# Patient Record
Sex: MALE | Race: WHITE | HISPANIC OR LATINO | Employment: OTHER | URBAN - METROPOLITAN AREA
[De-identification: names, ages, dates, MRNs, and addresses within clinical notes are randomized per-mention and may not be internally consistent; named-entity substitution may affect disease eponyms.]

---

## 2017-01-04 ENCOUNTER — HOSPITAL ENCOUNTER (INPATIENT)
Facility: HOSPITAL | Age: 72
LOS: 1 days | Discharge: HOME | DRG: 880 | End: 2017-01-05
Attending: EMERGENCY MEDICINE | Admitting: INTERNAL MEDICINE
Payer: MEDICARE

## 2017-01-04 ENCOUNTER — APPOINTMENT (EMERGENCY)
Dept: RADIOLOGY | Facility: HOSPITAL | Age: 72
DRG: 880 | End: 2017-01-04
Payer: MEDICARE

## 2017-01-04 ENCOUNTER — APPOINTMENT (EMERGENCY)
Dept: CT IMAGING | Facility: HOSPITAL | Age: 72
DRG: 880 | End: 2017-01-04
Payer: MEDICARE

## 2017-01-04 DIAGNOSIS — I10 UNCONTROLLED HYPERTENSION: Primary | ICD-10-CM

## 2017-01-04 DIAGNOSIS — I71.2 THORACIC ANEURYSM WITHOUT MENTION OF RUPTURE: ICD-10-CM

## 2017-01-04 PROBLEM — R29.898 LOWER EXTREMITY WEAKNESS: Status: ACTIVE | Noted: 2017-01-04

## 2017-01-04 LAB
ALBUMIN SERPL BCP-MCNC: 3.8 G/DL (ref 3.5–5)
ALP SERPL-CCNC: 59 U/L (ref 46–116)
ALT SERPL W P-5'-P-CCNC: 23 U/L (ref 12–78)
ANION GAP SERPL CALCULATED.3IONS-SCNC: 8 MMOL/L (ref 4–13)
AST SERPL W P-5'-P-CCNC: 15 U/L (ref 5–45)
ATRIAL RATE: 66 BPM
BACTERIA UR QL AUTO: ABNORMAL /HPF
BASOPHILS # BLD AUTO: 0.03 THOUSANDS/ΜL (ref 0–0.1)
BASOPHILS NFR BLD AUTO: 0 % (ref 0–1)
BILIRUB SERPL-MCNC: 1 MG/DL (ref 0.2–1)
BILIRUB UR QL STRIP: NEGATIVE
BUN SERPL-MCNC: 25 MG/DL (ref 5–25)
CALCIUM SERPL-MCNC: 8.9 MG/DL (ref 8.3–10.1)
CHLORIDE SERPL-SCNC: 104 MMOL/L (ref 100–108)
CK SERPL-CCNC: 126 U/L (ref 39–308)
CLARITY UR: CLEAR
CO2 SERPL-SCNC: 27 MMOL/L (ref 21–32)
COLOR UR: YELLOW
CREAT SERPL-MCNC: 0.98 MG/DL (ref 0.6–1.3)
EOSINOPHIL # BLD AUTO: 0.16 THOUSAND/ΜL (ref 0–0.61)
EOSINOPHIL NFR BLD AUTO: 1 % (ref 0–6)
ERYTHROCYTE [DISTWIDTH] IN BLOOD BY AUTOMATED COUNT: 12.3 % (ref 11.6–15.1)
GFR SERPL CREATININE-BSD FRML MDRD: >60 ML/MIN/1.73SQ M
GLUCOSE SERPL-MCNC: 93 MG/DL (ref 65–140)
GLUCOSE UR STRIP-MCNC: NEGATIVE MG/DL
HCT VFR BLD AUTO: 43 % (ref 36.5–49.3)
HGB BLD-MCNC: 14.8 G/DL (ref 12–17)
HGB UR QL STRIP.AUTO: ABNORMAL
KETONES UR STRIP-MCNC: NEGATIVE MG/DL
LEUKOCYTE ESTERASE UR QL STRIP: NEGATIVE
LYMPHOCYTES # BLD AUTO: 3.31 THOUSANDS/ΜL (ref 0.6–4.47)
LYMPHOCYTES NFR BLD AUTO: 26 % (ref 14–44)
MCH RBC QN AUTO: 32.7 PG (ref 26.8–34.3)
MCHC RBC AUTO-ENTMCNC: 34.4 G/DL (ref 31.4–37.4)
MCV RBC AUTO: 95 FL (ref 82–98)
MONOCYTES # BLD AUTO: 0.93 THOUSAND/ΜL (ref 0.17–1.22)
MONOCYTES NFR BLD AUTO: 7 % (ref 4–12)
NEUTROPHILS # BLD AUTO: 8.26 THOUSANDS/ΜL (ref 1.85–7.62)
NEUTS SEG NFR BLD AUTO: 65 % (ref 43–75)
NITRITE UR QL STRIP: NEGATIVE
NON-SQ EPI CELLS URNS QL MICRO: ABNORMAL /HPF
NRBC BLD AUTO-RTO: 0 /100 WBCS
P AXIS: 37 DEGREES
PH UR STRIP.AUTO: 6.5 [PH] (ref 4.5–8)
PLATELET # BLD AUTO: 357 THOUSANDS/UL (ref 149–390)
PMV BLD AUTO: 10.8 FL (ref 8.9–12.7)
POTASSIUM SERPL-SCNC: 3.8 MMOL/L (ref 3.5–5.3)
PR INTERVAL: 172 MS
PROT SERPL-MCNC: 7.4 G/DL (ref 6.4–8.2)
PROT UR STRIP-MCNC: ABNORMAL MG/DL
QRS AXIS: -38 DEGREES
QRSD INTERVAL: 98 MS
QT INTERVAL: 378 MS
QTC INTERVAL: 396 MS
RBC # BLD AUTO: 4.52 MILLION/UL (ref 3.88–5.62)
RBC #/AREA URNS AUTO: ABNORMAL /HPF
SODIUM SERPL-SCNC: 139 MMOL/L (ref 136–145)
SP GR UR STRIP.AUTO: 1.01 (ref 1–1.03)
T WAVE AXIS: 37 DEGREES
TROPONIN I SERPL-MCNC: <0.02 NG/ML
UROBILINOGEN UR QL STRIP.AUTO: 0.2 E.U./DL
VENTRICULAR RATE: 66 BPM
WBC # BLD AUTO: 12.76 THOUSAND/UL (ref 4.31–10.16)
WBC #/AREA URNS AUTO: ABNORMAL /HPF

## 2017-01-04 PROCEDURE — 82550 ASSAY OF CK (CPK): CPT | Performed by: INTERNAL MEDICINE

## 2017-01-04 PROCEDURE — 74175 CTA ABDOMEN W/CONTRAST: CPT

## 2017-01-04 PROCEDURE — 85025 COMPLETE CBC W/AUTO DIFF WBC: CPT | Performed by: EMERGENCY MEDICINE

## 2017-01-04 PROCEDURE — 84484 ASSAY OF TROPONIN QUANT: CPT | Performed by: EMERGENCY MEDICINE

## 2017-01-04 PROCEDURE — 93005 ELECTROCARDIOGRAM TRACING: CPT | Performed by: EMERGENCY MEDICINE

## 2017-01-04 PROCEDURE — 80053 COMPREHEN METABOLIC PANEL: CPT | Performed by: EMERGENCY MEDICINE

## 2017-01-04 PROCEDURE — 36415 COLL VENOUS BLD VENIPUNCTURE: CPT | Performed by: EMERGENCY MEDICINE

## 2017-01-04 PROCEDURE — 71020 HB CHEST X-RAY 2VW FRONTAL&LATL: CPT

## 2017-01-04 PROCEDURE — 87086 URINE CULTURE/COLONY COUNT: CPT | Performed by: EMERGENCY MEDICINE

## 2017-01-04 PROCEDURE — 71275 CT ANGIOGRAPHY CHEST: CPT

## 2017-01-04 PROCEDURE — 70450 CT HEAD/BRAIN W/O DYE: CPT

## 2017-01-04 PROCEDURE — 81001 URINALYSIS AUTO W/SCOPE: CPT | Performed by: EMERGENCY MEDICINE

## 2017-01-04 RX ORDER — LISINOPRIL 20 MG/1
20 TABLET ORAL DAILY
COMMUNITY
End: 2017-09-13

## 2017-01-04 RX ORDER — LOSARTAN POTASSIUM 50 MG/1
100 TABLET ORAL DAILY
Status: DISCONTINUED | OUTPATIENT
Start: 2017-01-05 | End: 2017-01-05 | Stop reason: HOSPADM

## 2017-01-04 RX ORDER — ALPRAZOLAM 0.5 MG/1
0.25 TABLET ORAL 2 TIMES DAILY PRN
Status: DISCONTINUED | OUTPATIENT
Start: 2017-01-04 | End: 2017-01-05 | Stop reason: HOSPADM

## 2017-01-04 RX ADMIN — METOPROLOL TARTRATE 2.5 MG: 5 INJECTION, SOLUTION INTRAVENOUS at 18:55

## 2017-01-04 RX ADMIN — METOPROLOL TARTRATE 25 MG: 25 TABLET ORAL at 21:29

## 2017-01-04 RX ADMIN — ALPRAZOLAM 0.25 MG: 0.5 TABLET ORAL at 18:55

## 2017-01-04 RX ADMIN — IOHEXOL 100 ML: 350 INJECTION, SOLUTION INTRAVENOUS at 15:48

## 2017-01-05 ENCOUNTER — APPOINTMENT (INPATIENT)
Dept: NON INVASIVE DIAGNOSTICS | Facility: HOSPITAL | Age: 72
DRG: 880 | End: 2017-01-05
Payer: MEDICARE

## 2017-01-05 VITALS
BODY MASS INDEX: 29.68 KG/M2 | OXYGEN SATURATION: 98 % | HEART RATE: 70 BPM | WEIGHT: 178.13 LBS | RESPIRATION RATE: 18 BRPM | DIASTOLIC BLOOD PRESSURE: 79 MMHG | SYSTOLIC BLOOD PRESSURE: 146 MMHG | HEIGHT: 65 IN | TEMPERATURE: 98.1 F

## 2017-01-05 LAB
BACTERIA UR CULT: NORMAL
MAGNESIUM SERPL-MCNC: 2.2 MG/DL (ref 1.6–2.6)
PLATELET # BLD AUTO: 357 THOUSANDS/UL (ref 149–390)
PMV BLD AUTO: 10.7 FL (ref 8.9–12.7)
TSH SERPL DL<=0.05 MIU/L-ACNC: 2.22 UIU/ML (ref 0.36–3.74)

## 2017-01-05 PROCEDURE — G8980 MOBILITY D/C STATUS: HCPCS

## 2017-01-05 PROCEDURE — 97162 PT EVAL MOD COMPLEX 30 MIN: CPT

## 2017-01-05 PROCEDURE — 85049 AUTOMATED PLATELET COUNT: CPT | Performed by: INTERNAL MEDICINE

## 2017-01-05 PROCEDURE — G8978 MOBILITY CURRENT STATUS: HCPCS

## 2017-01-05 PROCEDURE — 84443 ASSAY THYROID STIM HORMONE: CPT | Performed by: INTERNAL MEDICINE

## 2017-01-05 PROCEDURE — 99285 EMERGENCY DEPT VISIT HI MDM: CPT

## 2017-01-05 PROCEDURE — G8979 MOBILITY GOAL STATUS: HCPCS

## 2017-01-05 PROCEDURE — 83735 ASSAY OF MAGNESIUM: CPT | Performed by: INTERNAL MEDICINE

## 2017-01-05 PROCEDURE — 36415 COLL VENOUS BLD VENIPUNCTURE: CPT | Performed by: INTERNAL MEDICINE

## 2017-01-05 RX ORDER — LOSARTAN POTASSIUM 100 MG/1
100 TABLET ORAL DAILY
Qty: 30 TABLET | Refills: 0 | Status: SHIPPED | OUTPATIENT
Start: 2017-01-05 | End: 2018-08-28

## 2017-01-05 RX ORDER — ONDANSETRON 2 MG/ML
4 INJECTION INTRAMUSCULAR; INTRAVENOUS EVERY 6 HOURS PRN
Status: DISCONTINUED | OUTPATIENT
Start: 2017-01-05 | End: 2017-01-05 | Stop reason: HOSPADM

## 2017-01-05 RX ORDER — ACETAMINOPHEN 325 MG/1
650 TABLET ORAL EVERY 6 HOURS PRN
Status: DISCONTINUED | OUTPATIENT
Start: 2017-01-05 | End: 2017-01-05 | Stop reason: HOSPADM

## 2017-01-05 RX ORDER — ALPRAZOLAM 0.25 MG/1
0.25 TABLET ORAL 2 TIMES DAILY PRN
Qty: 15 TABLET | Refills: 0 | Status: SHIPPED | OUTPATIENT
Start: 2017-01-05 | End: 2018-02-19

## 2017-01-05 RX ADMIN — LOSARTAN POTASSIUM 100 MG: 50 TABLET, FILM COATED ORAL at 10:45

## 2017-01-05 RX ADMIN — ENOXAPARIN SODIUM 40 MG: 40 INJECTION SUBCUTANEOUS at 10:45

## 2017-01-05 RX ADMIN — METOPROLOL TARTRATE 12.5 MG: 25 TABLET ORAL at 10:45

## 2017-09-01 ENCOUNTER — ALLSCRIPTS OFFICE VISIT (OUTPATIENT)
Dept: OTHER | Facility: OTHER | Age: 72
End: 2017-09-01

## 2017-09-01 DIAGNOSIS — R26.9 ABNORMALITY OF GAIT AND MOBILITY: ICD-10-CM

## 2017-09-12 ENCOUNTER — HOSPITAL ENCOUNTER (OUTPATIENT)
Dept: MRI IMAGING | Facility: CLINIC | Age: 72
Discharge: HOME | End: 2017-09-12
Payer: MEDICARE

## 2017-09-12 DIAGNOSIS — R26.9 ABNORMALITY OF GAIT AND MOBILITY: ICD-10-CM

## 2017-09-12 PROCEDURE — 70551 MRI BRAIN STEM W/O DYE: CPT

## 2017-09-19 ENCOUNTER — ALLSCRIPTS OFFICE VISIT (OUTPATIENT)
Dept: OTHER | Facility: OTHER | Age: 72
End: 2017-09-19

## 2018-01-14 VITALS
HEART RATE: 74 BPM | WEIGHT: 175 LBS | BODY MASS INDEX: 29.16 KG/M2 | RESPIRATION RATE: 18 BRPM | DIASTOLIC BLOOD PRESSURE: 112 MMHG | SYSTOLIC BLOOD PRESSURE: 188 MMHG | HEIGHT: 65 IN

## 2018-01-15 NOTE — PROCEDURES
Results/Data    Procedure: Electromyogram and Nerve Conduction Study  Indication: Bilateral Lower Extremities   Referred by Dr Ora Ahumada  The procedure's were discussed with the patient  Written consent was obtained prior to the procedure and is detailed in the patient's record  Prior to the start of the procedure a time out was taken and the identity of the patient was confirmed via name and date of birth with the patient  The correct site and the procedure to be performed were confirmed  The correct side was confirmed if applicable  The positioning of the patient was verified  The availability of the correct equipment was verified  Procedure Start Time: 10:30    Technique: A sterile concentric needle electrode was used  The patient tolerated the procedure well  There were no complications  Results : Motor and sensory nerve conduction studies were performed on the bilateral peroneal, tibial and sural nerves  The right peroneal motor terminal latency was within normal limits with a low compound motor action potential amplitude and a normal conduction velocity distally and across the fibula head  The left peroneal motor terminal latency was within normal limits with a low compound motor action potential amplitude and a normal conduction velocity distally and across the fibula head  The bilateral tibial compound motor action potentials were within normal limits  The bilateral peroneal and tibial F waves were within normal limits  The rightsural sensory peak latency was within normal limits with a low sensory action potential amplitude  The left sural sensory action potential was within normal limits  The bilateral H soleus responses were within normal limits  Concentric needle examination was performed on various proximal and distal muscles bilaterally including gluteus medius, vastus lateralis, tibialis anterior, medial gastrocnemius, EDB, L4-5 and L5-S1 paraspinal myotomes   There was no evidence of active denervation in any of the muscles tested  Mild decreased recruitment of giant motor units was noted in the bilateral gluteus medius and moderate decreased recruitment of polyphasic motor units was noted in the bilateral EDB  The compound motor unit action potentials were of normal configuration with interference patterns being full or full for effort in the remaining muscles tested  Interpretation: There is electrophysiologic evidence of a:    1  Bilateral moderate chronic L5 radiculopathy as evidenced by the low peroneal motor amplitude and chronic denervation changes in the above-mentioned muscles  2  The low sural sensory amplitude on the right is of questionable significance  It maybe secondary to technical letter  Clinical and imaging correlation of the lumbosacral spine is suggested        Signatures   Electronically signed by : Astrid Mead MD; Sep 19 2017 11:33AM EST                       (Author)

## 2018-02-19 ENCOUNTER — OFFICE VISIT (OUTPATIENT)
Dept: NEUROLOGY | Facility: CLINIC | Age: 73
End: 2018-02-19
Payer: MEDICARE

## 2018-02-19 VITALS
HEART RATE: 73 BPM | SYSTOLIC BLOOD PRESSURE: 130 MMHG | DIASTOLIC BLOOD PRESSURE: 98 MMHG | WEIGHT: 181 LBS | BODY MASS INDEX: 30.12 KG/M2

## 2018-02-19 DIAGNOSIS — F41.9 ANXIETY: ICD-10-CM

## 2018-02-19 DIAGNOSIS — M54.16 RADICULOPATHY, LUMBAR REGION: Primary | ICD-10-CM

## 2018-02-19 PROCEDURE — 99213 OFFICE O/P EST LOW 20 MIN: CPT | Performed by: PSYCHIATRY & NEUROLOGY

## 2018-02-19 NOTE — PROGRESS NOTES
Progress Note - Neurology   Marcus Barrett 74 y o  male MRN: [id number]  Unit/Bed#:  Encounter: [id number]      Subjective:   Patient is here for a follow-up visit and since his last visit he has been doing well with improved gait, and denies any sensory motor symptoms in the lower extremities  Patient did not get his blood work done and had EMG studies which showed evidence of chronic bilateral L5 radiculopathy and an MRI of the brain also showed evidence of chronic T2 white matter changes  No evidence of any normal pressure hydrocephalus or any acute CVA  Patient denies any new neurological symptoms at this time  ROS:   Review of Systems   Constitutional: Negative  HENT: Negative  Eyes: Negative  Respiratory: Negative  Cardiovascular: Negative  Gastrointestinal: Negative  Endocrine: Negative  Genitourinary: Negative  Musculoskeletal: Negative  Skin: Negative  Allergic/Immunologic: Negative  Neurological: Negative  Hematological: Negative  Psychiatric/Behavioral: Negative  Vitals:   Vitals:    02/19/18 1417   BP: 130/98   Pulse: 73   ,Body mass index is 30 12 kg/m²  MEDS:      Current Outpatient Prescriptions:     losartan (COZAAR) 100 MG tablet, Take 1 tablet by mouth daily for 30 days, Disp: 30 tablet, Rfl: 0    metoprolol tartrate (LOPRESSOR) 25 mg tablet, Take 0 5 tablets by mouth every 12 (twelve) hours for 30 days, Disp: 30 tablet, Rfl: 0  :    Physical Exam:  General appearance: alert, appears stated age and cooperative  Head: Normocephalic, without obvious abnormality, atraumatic    Neurologic:  His examination shows no evidence of any cranial nerve deficit, motor or sensory deficits in the upper lower extremities, there is no evidence of any dysmetria and his gait is normal based  There is no evidence of any cervical or lumbosacral tenderness  No bruits were appreciable in the neck      Lab Results: I have personally reviewed pertinent reports  Imaging Studies: I have personally reviewed pertinent reports  Assessment:  1  Chronic lumbar radiculopathy  2  Anxiety disorder  Plan:  Patient is advised to continue med present medications, home exercise program is encouraged and will follow up with me again in 6 months  2/19/2018,2:22 PM    Dictation voice to text software has been used in the creation of this document  Please consider this in light of any contextual or grammatical errors

## 2018-03-14 ENCOUNTER — APPOINTMENT (EMERGENCY)
Dept: RADIOLOGY | Facility: HOSPITAL | Age: 73
End: 2018-03-14
Payer: COMMERCIAL

## 2018-03-14 ENCOUNTER — HOSPITAL ENCOUNTER (EMERGENCY)
Facility: HOSPITAL | Age: 73
Discharge: HOME | End: 2018-03-14
Admitting: EMERGENCY MEDICINE
Payer: COMMERCIAL

## 2018-03-14 VITALS
DIASTOLIC BLOOD PRESSURE: 81 MMHG | HEART RATE: 58 BPM | SYSTOLIC BLOOD PRESSURE: 133 MMHG | BODY MASS INDEX: 29.95 KG/M2 | TEMPERATURE: 97.5 F | OXYGEN SATURATION: 98 % | RESPIRATION RATE: 16 BRPM | WEIGHT: 180 LBS

## 2018-03-14 DIAGNOSIS — F43.0 STRESS REACTION: ICD-10-CM

## 2018-03-14 DIAGNOSIS — R07.89 DISCOMFORT IN CHEST: Primary | ICD-10-CM

## 2018-03-14 LAB
ALBUMIN SERPL BCP-MCNC: 3.3 G/DL (ref 3.5–5)
ALP SERPL-CCNC: 62 U/L (ref 46–116)
ALT SERPL W P-5'-P-CCNC: 25 U/L (ref 12–78)
ANION GAP SERPL CALCULATED.3IONS-SCNC: 6 MMOL/L (ref 4–13)
AST SERPL W P-5'-P-CCNC: 19 U/L (ref 5–45)
BASOPHILS # BLD AUTO: 0.05 THOUSANDS/ΜL (ref 0–0.1)
BASOPHILS NFR BLD AUTO: 0 % (ref 0–1)
BILIRUB SERPL-MCNC: 0.4 MG/DL (ref 0.2–1)
BUN SERPL-MCNC: 21 MG/DL (ref 5–25)
CALCIUM SERPL-MCNC: 8.2 MG/DL (ref 8.3–10.1)
CHLORIDE SERPL-SCNC: 105 MMOL/L (ref 100–108)
CO2 SERPL-SCNC: 29 MMOL/L (ref 21–32)
CREAT SERPL-MCNC: 1.15 MG/DL (ref 0.6–1.3)
EOSINOPHIL # BLD AUTO: 0.41 THOUSAND/ΜL (ref 0–0.61)
EOSINOPHIL NFR BLD AUTO: 4 % (ref 0–6)
ERYTHROCYTE [DISTWIDTH] IN BLOOD BY AUTOMATED COUNT: 12 % (ref 11.6–15.1)
GFR SERPL CREATININE-BSD FRML MDRD: 62 ML/MIN/1.73SQ M
GLUCOSE SERPL-MCNC: 120 MG/DL (ref 65–140)
HCT VFR BLD AUTO: 42.1 % (ref 36.5–49.3)
HGB BLD-MCNC: 14.4 G/DL (ref 12–17)
LYMPHOCYTES # BLD AUTO: 3.25 THOUSANDS/ΜL (ref 0.6–4.47)
LYMPHOCYTES NFR BLD AUTO: 28 % (ref 14–44)
MCH RBC QN AUTO: 33.1 PG (ref 26.8–34.3)
MCHC RBC AUTO-ENTMCNC: 34.2 G/DL (ref 31.4–37.4)
MCV RBC AUTO: 97 FL (ref 82–98)
MONOCYTES # BLD AUTO: 0.78 THOUSAND/ΜL (ref 0.17–1.22)
MONOCYTES NFR BLD AUTO: 7 % (ref 4–12)
NEUTROPHILS # BLD AUTO: 7.03 THOUSANDS/ΜL (ref 1.85–7.62)
NEUTS SEG NFR BLD AUTO: 61 % (ref 43–75)
NRBC BLD AUTO-RTO: 0 /100 WBCS
PLATELET # BLD AUTO: 276 THOUSANDS/UL (ref 149–390)
PMV BLD AUTO: 10.6 FL (ref 8.9–12.7)
POTASSIUM SERPL-SCNC: 3.7 MMOL/L (ref 3.5–5.3)
PROT SERPL-MCNC: 6.8 G/DL (ref 6.4–8.2)
RBC # BLD AUTO: 4.35 MILLION/UL (ref 3.88–5.62)
SODIUM SERPL-SCNC: 140 MMOL/L (ref 136–145)
TROPONIN I SERPL-MCNC: <0.02 NG/ML
TROPONIN I SERPL-MCNC: <0.02 NG/ML
WBC # BLD AUTO: 11.55 THOUSAND/UL (ref 4.31–10.16)

## 2018-03-14 PROCEDURE — 85025 COMPLETE CBC W/AUTO DIFF WBC: CPT | Performed by: NURSE PRACTITIONER

## 2018-03-14 PROCEDURE — 80053 COMPREHEN METABOLIC PANEL: CPT | Performed by: NURSE PRACTITIONER

## 2018-03-14 PROCEDURE — 99285 EMERGENCY DEPT VISIT HI MDM: CPT

## 2018-03-14 PROCEDURE — 36415 COLL VENOUS BLD VENIPUNCTURE: CPT | Performed by: NURSE PRACTITIONER

## 2018-03-14 PROCEDURE — 71046 X-RAY EXAM CHEST 2 VIEWS: CPT

## 2018-03-14 PROCEDURE — 84484 ASSAY OF TROPONIN QUANT: CPT | Performed by: NURSE PRACTITIONER

## 2018-03-14 PROCEDURE — 93005 ELECTROCARDIOGRAM TRACING: CPT

## 2018-03-14 PROCEDURE — 93010 ELECTROCARDIOGRAM REPORT: CPT | Performed by: INTERNAL MEDICINE

## 2018-03-14 NOTE — ED NOTES
History     Chief Complaint   Patient presents with    Chest Pain     Pt c/o left sided chest pain but states he thinks its stress/anxiety  Pt states no heart problems but PCP had him do an ECHO and blood work last week  Pt states he took " one of my tranquilizer pills to see if it would help " Pt does appear anxious  This is a 74year old male presenting to the ER for c/o chest pain while driving and drinking strong coffee  He states that he thinks its due to anxiety and took his "tranquilzer pill"  The pain was on his left chest with radiation of pain into his neck  Denies pain in his left arm and through to his back  He has a history of HTN and has been compliant with his medication  He admits to taking ASA 81 mg prior to arrival      Differential Diagnosis- Panic Attack/ Anxiety, ACS,             Past Medical History:   Diagnosis Date    Anxiety     Hypertension     Leg weakness        History reviewed  No pertinent surgical history  History reviewed  No pertinent family history  Social History   Substance Use Topics    Smoking status: Never Smoker    Smokeless tobacco: Never Used    Alcohol use No       Review of Systems   Constitutional: Negative for diaphoresis  HENT: Negative for congestion  Eyes: Negative for visual disturbance  Respiratory: Negative for cough, shortness of breath and wheezing  Cardiovascular: Positive for chest pain  Negative for leg swelling  Gastrointestinal: Negative for abdominal pain, diarrhea, nausea and vomiting  Endocrine: Negative  Genitourinary: Negative  Musculoskeletal: Negative  Skin: Negative  Allergic/Immunologic: Negative  Neurological: Negative for dizziness, syncope, weakness, light-headedness and numbness  Hematological: Negative  Psychiatric/Behavioral: Negative          Physical Exam     ED Triage Vitals   Temperature Pulse Respirations Blood Pressure SpO2   03/14/18 1851 03/14/18 1849 03/14/18 1849 03/14/18 1849 03/14/18 1849   97 5 °F (36 4 °C) 77 20 (!) 188/97 97 %      Temp Source Heart Rate Source Patient Position - Orthostatic VS BP Location FiO2 (%)   03/14/18 1851 03/14/18 1849 03/14/18 1849 03/14/18 1849 --   Oral Monitor Sitting Left arm       Pain Score       --                  Physical Exam   Constitutional: He is oriented to person, place, and time  He appears well-developed and well-nourished  No distress  HENT:   Head: Normocephalic  Eyes: EOM are normal    Neck: Normal range of motion  Neck supple  No JVD present  Cardiovascular: Normal rate, regular rhythm, normal heart sounds and intact distal pulses  Exam reveals no gallop and no friction rub  No murmur heard  Pulmonary/Chest: Effort normal and breath sounds normal  No respiratory distress  He has no wheezes  He exhibits tenderness  Abdominal: Soft  Bowel sounds are normal  He exhibits no distension  There is no tenderness  Musculoskeletal: Normal range of motion  He exhibits no edema  Neurological: He is alert and oriented to person, place, and time  Skin: Skin is warm and dry  Psychiatric: His mood appears anxious         ED Course     Assessment and Plan    Chest Pain:    · Labs- CBC, CMP, Troponin + Delta Trop, Magnesium  · EKG  · Chest X-ray  · Insert peripheral IV  · Continuous Cardiac  Monitoring    XR chest 2 views    (Results Pending)   CxR- Unremarkable  Results Reviewed     Procedure Component Value Units Date/Time    Troponin I [[id number]]  (Normal) Collected:  03/14/18 2149    Lab Status:  Final result Specimen:  Blood from Arm, Right Updated:  03/14/18 2214     Troponin I <0 02 ng/mL     Narrative:         Siemens Chemistry analyzer 99% cutoff is > 0 04 ng/mL in network labs    o cTnI 99% cutoff is useful only when applied to patients in the clinical setting of myocardial ischemia  o cTnI 99% cutoff should be interpreted in the context of clinical history, ECG findings and possibly cardiac imaging to establish correct diagnosis  o cTnI 99% cutoff may be suggestive but clearly not indicative of a coronary event without the clinical setting of myocardial ischemia  Comprehensive metabolic panel [[id number]]  (Abnormal) Collected:  03/14/18 2011    Lab Status:  Final result Specimen:  Blood from Arm, Right Updated:  03/14/18 2129     Sodium 140 mmol/L      Potassium 3 7 mmol/L      Chloride 105 mmol/L      CO2 29 mmol/L      Anion Gap 6 mmol/L      BUN 21 mg/dL      Creatinine 1 15 mg/dL      Glucose 120 mg/dL      Calcium 8 2 (L) mg/dL      AST 19 U/L      ALT 25 U/L      Alkaline Phosphatase 62 U/L      Total Protein 6 8 g/dL      Albumin 3 3 (L) g/dL      Total Bilirubin 0 40 mg/dL      eGFR 62 ml/min/1 73sq m     Narrative:       Specimen Lipemic    National Kidney Disease Education Program recommendations are as follows:  GFR calculation is accurate only with a steady state creatinine  Chronic Kidney disease less than 60 ml/min/1 73 sq  meters  Kidney failure less than 15 ml/min/1 73 sq  meters  Troponin I [[id number]]  (Normal) Collected:  03/14/18 2011    Lab Status:  Final result Specimen:  Blood from Arm, Right Updated:  03/14/18 2037     Troponin I <0 02 ng/mL     Narrative:         [employer] Chemistry analyzer 99% cutoff is > 0 04 ng/mL in network labs    o cTnI 99% cutoff is useful only when applied to patients in the clinical setting of myocardial ischemia  o cTnI 99% cutoff should be interpreted in the context of clinical history, ECG findings and possibly cardiac imaging to establish correct diagnosis  o cTnI 99% cutoff may be suggestive but clearly not indicative of a coronary event without the clinical setting of myocardial ischemia      CBC and differential [[id number]]  (Abnormal) Collected:  03/14/18 2011    Lab Status:  Final result Specimen:  Blood from Arm, Right Updated:  03/14/18 2018     WBC 11 55 (H) Thousand/uL      RBC 4 35 Million/uL      Hemoglobin 14 4 g/dL      Hematocrit 42 1 %      MCV 97 fL      MCH 33 1 pg      MCHC 34 2 g/dL      RDW 12 0 %      MPV 10 6 fL      Platelets 341 Thousands/uL      nRBC 0 /100 WBCs      Neutrophils Relative 61 %      Lymphocytes Relative 28 %      Monocytes Relative 7 %      Eosinophils Relative 4 %      Basophils Relative 0 %      Neutrophils Absolute 7 03 Thousands/µL      Lymphocytes Absolute 3 25 Thousands/µL      Monocytes Absolute 0 78 Thousand/µL      Eosinophils Absolute 0 41 Thousand/µL      Basophils Absolute 0 05 Thousands/µL         EKG- Sinus Rhythm    Impression/Recommendations:    Non-Specific Chest Pain  · Probable anxiety related  · All labs, EKG and Xray unremarkable  · Follow up with your PCP  · Continue current medications  · Return to the ER for worsening problems/concerns        Elie Angeles RN, BSN/ NP KEVAN Burgess, RN  03/14/18 6952

## 2018-03-14 NOTE — ED PROVIDER NOTES
History  Chief Complaint   Patient presents with    Chest Pain     Pt c/o left sided chest pain but states he thinks its stress/anxiety  Pt states no heart problems but PCP had him do an ECHO and blood work last week  Pt states he took " one of my tranquilizer pills to see if it would help " Pt does appear anxious  This is a 67-year-old male developed some chest discomfort while driving  This was occurring around 6:00 p m  he states that he took 1 of his tranquilizer pills which we believe is alprazolam for his symptoms  He reports that he also took some aspirin  He denies any nausea vomiting or diaphoresis  There is no radiation of the pain  Seems to be located over the left anterior chest wall  It does not seem to be exertional in is improved at this point  Plan is to evaluate him for acute coronary syndrome  Will likely need to do delta troponin given that his symptoms occurred at 6:00 p m  will repeat troponin at 10:00 p m  Lajean Cassette Prior to Admission Medications   Prescriptions Last Dose Informant Patient Reported? Taking?   losartan (COZAAR) 100 MG tablet   No No   Sig: Take 1 tablet by mouth daily for 30 days   metoprolol tartrate (LOPRESSOR) 25 mg tablet   No No   Sig: Take 0 5 tablets by mouth every 12 (twelve) hours for 30 days      Facility-Administered Medications: None       Past Medical History:   Diagnosis Date    Anxiety     Hypertension     Leg weakness        History reviewed  No pertinent surgical history  History reviewed  No pertinent family history  I have reviewed and agree with the history as documented  Social History   Substance Use Topics    Smoking status: Never Smoker    Smokeless tobacco: Never Used    Alcohol use No        Review of Systems   Constitutional: Negative for diaphoresis, fatigue and fever  HENT: Negative for congestion, ear pain, nosebleeds and sore throat  Eyes: Negative for photophobia, pain, discharge and visual disturbance  Respiratory: Negative for cough, choking, chest tightness, shortness of breath and wheezing  Cardiovascular: Positive for chest pain  Negative for palpitations  Gastrointestinal: Negative for abdominal distention, abdominal pain, diarrhea and vomiting  Genitourinary: Negative for dysuria, flank pain and frequency  Musculoskeletal: Negative for back pain, gait problem and joint swelling  Skin: Negative for color change and rash  Neurological: Negative for dizziness, syncope and headaches  Psychiatric/Behavioral: Negative for behavioral problems and confusion  The patient is nervous/anxious  All other systems reviewed and are negative  Physical Exam  ED Triage Vitals   Temperature Pulse Respirations Blood Pressure SpO2   03/14/18 1851 03/14/18 1849 03/14/18 1849 03/14/18 1849 03/14/18 1849   97 5 °F (36 4 °C) 77 20 (!) 188/97 97 %      Temp Source Heart Rate Source Patient Position - Orthostatic VS BP Location FiO2 (%)   03/14/18 1851 03/14/18 1849 03/14/18 1849 03/14/18 1849 --   Oral Monitor Sitting Left arm       Pain Score       --                  Orthostatic Vital Signs  Vitals:    03/14/18 1849   BP: (!) 188/97   Pulse: 77   Patient Position - Orthostatic VS: Sitting       Physical Exam   Constitutional: He is oriented to person, place, and time  He appears well-developed and well-nourished  HENT:   Head: Normocephalic and atraumatic  Eyes: Pupils are equal, round, and reactive to light  Neck: Normal range of motion  Neck supple  Cardiovascular: Normal rate, regular rhythm, normal heart sounds and normal pulses  PMI is not displaced  Pulmonary/Chest: Effort normal and breath sounds normal  No respiratory distress  Abdominal: Soft  He exhibits no distension  There is no guarding  Musculoskeletal: Normal range of motion  Lymphadenopathy:     He has no cervical adenopathy  Neurological: He is alert and oriented to person, place, and time  Skin: Skin is warm and dry  No rash noted  He is not diaphoretic  No pallor  Psychiatric: His mood appears anxious  Vitals reviewed  ED Medications  Medications - No data to display    Diagnostic Studies  Results Reviewed     Procedure Component Value Units Date/Time    Troponin I [[id number]]  (Normal) Collected:  03/14/18 2149    Lab Status:  Final result Specimen:  Blood from Arm, Right Updated:  03/14/18 2214     Troponin I <0 02 ng/mL     Narrative:         [employer] Chemistry analyzer 99% cutoff is > 0 04 ng/mL in network labs    o cTnI 99% cutoff is useful only when applied to patients in the clinical setting of myocardial ischemia  o cTnI 99% cutoff should be interpreted in the context of clinical history, ECG findings and possibly cardiac imaging to establish correct diagnosis  o cTnI 99% cutoff may be suggestive but clearly not indicative of a coronary event without the clinical setting of myocardial ischemia  Comprehensive metabolic panel [[id number]]  (Abnormal) Collected:  03/14/18 2011    Lab Status:  Final result Specimen:  Blood from Arm, Right Updated:  03/14/18 2129     Sodium 140 mmol/L      Potassium 3 7 mmol/L      Chloride 105 mmol/L      CO2 29 mmol/L      Anion Gap 6 mmol/L      BUN 21 mg/dL      Creatinine 1 15 mg/dL      Glucose 120 mg/dL      Calcium 8 2 (L) mg/dL      AST 19 U/L      ALT 25 U/L      Alkaline Phosphatase 62 U/L      Total Protein 6 8 g/dL      Albumin 3 3 (L) g/dL      Total Bilirubin 0 40 mg/dL      eGFR 62 ml/min/1 73sq m     Narrative:       Specimen Lipemic    National Kidney Disease Education Program recommendations are as follows:  GFR calculation is accurate only with a steady state creatinine  Chronic Kidney disease less than 60 ml/min/1 73 sq  meters  Kidney failure less than 15 ml/min/1 73 sq  meters      Troponin I [[id number]]  (Normal) Collected:  03/14/18 2011    Lab Status:  Final result Specimen:  Blood from Arm, Right Updated:  03/14/18 2037     Troponin I <0 02 ng/mL Narrative:         [employer] Chemistry analyzer 99% cutoff is > 0 04 ng/mL in network labs    o cTnI 99% cutoff is useful only when applied to patients in the clinical setting of myocardial ischemia  o cTnI 99% cutoff should be interpreted in the context of clinical history, ECG findings and possibly cardiac imaging to establish correct diagnosis  o cTnI 99% cutoff may be suggestive but clearly not indicative of a coronary event without the clinical setting of myocardial ischemia  CBC and differential [[id number]]  (Abnormal) Collected:  03/14/18 2011    Lab Status:  Final result Specimen:  Blood from Arm, Right Updated:  03/14/18 2018     WBC 11 55 (H) Thousand/uL      RBC 4 35 Million/uL      Hemoglobin 14 4 g/dL      Hematocrit 42 1 %      MCV 97 fL      MCH 33 1 pg      MCHC 34 2 g/dL      RDW 12 0 %      MPV 10 6 fL      Platelets 602 Thousands/uL      nRBC 0 /100 WBCs      Neutrophils Relative 61 %      Lymphocytes Relative 28 %      Monocytes Relative 7 %      Eosinophils Relative 4 %      Basophils Relative 0 %      Neutrophils Absolute 7 03 Thousands/µL      Lymphocytes Absolute 3 25 Thousands/µL      Monocytes Absolute 0 78 Thousand/µL      Eosinophils Absolute 0 41 Thousand/µL      Basophils Absolute 0 05 Thousands/µL                  XR chest 2 views   ED Interpretation by MEREDITH Gross (03/14 2232)   No concerning findings, no infiltrates                   Procedures  Procedures       Phone Contacts  ED Phone Contact    ED Course  ED Course          HEART Risk Score    Flowsheet Row Most Recent Value   History  0 Filed at: 03/14/2018 2237   ECG  0 Filed at: 03/14/2018 2237   Age  2 Filed at: 03/14/2018 2237   Risk Factors  1 Filed at: 03/14/2018 2237   Troponin  0 Filed at: 03/14/2018 2237   Heart Score Risk Calculator   History  0 Filed at: 03/14/2018 2237   ECG  0 Filed at: 03/14/2018 2237   Age  2 Filed at: 03/14/2018 2237   Risk Factors  1 Filed at: 03/14/2018 2237   Troponin  0 Filed at: 03/14/2018 2237   HEART Score  3 Filed at: 03/14/2018 2237   HEART Score  3 Filed at: 03/14/2018 2237                            MDM  Number of Diagnoses or Management Options  Discomfort in chest: new and requires workup  Stress reaction: new and requires workup  Diagnosis management comments: EKG was unremarkable  No ST elevation  Negative troponin x2  Heart score of 3  Recommend follow-up with PCP  Return precautions discussed  Amount and/or Complexity of Data Reviewed  Clinical lab tests: reviewed and ordered  Tests in the radiology section of CPT®: reviewed and ordered  Tests in the medicine section of CPT®: reviewed and ordered  Independent visualization of images, tracings, or specimens: yes    Patient Progress  Patient progress: stable    CritCare Time    Disposition  Final diagnoses:   Discomfort in chest   Stress reaction     Time reflects when diagnosis was documented in both MDM as applicable and the Disposition within this note     Time User Action Codes Description Comment    3/14/2018 10:30 PM Biju Barajas Add [R07 89] Discomfort in chest     3/14/2018 10:30 PM Biju Barajas Add [F43 0] Stress reaction       ED Disposition     ED Disposition Condition Comment    Discharge  Arianne Robertson discharge to home/self care  Condition at discharge: Good        Follow-up Information     Follow up With Specialties Details Why Anish Saeed MD  Schedule an appointment as soon as possible for a visit For Continued Evaluation [address]  [address] [place] [id number]  [phone]          Patient's Medications   Discharge Prescriptions    No medications on file     No discharge procedures on file      ED Provider  Electronically Signed by           Sabrina Graf  03/14/18 2238

## 2018-03-15 LAB
ATRIAL RATE: 72 BPM
P AXIS: 50 DEGREES
PR INTERVAL: 166 MS
QRS AXIS: -44 DEGREES
QRSD INTERVAL: 106 MS
QT INTERVAL: 394 MS
QTC INTERVAL: 431 MS
T WAVE AXIS: 15 DEGREES
VENTRICULAR RATE: 72 BPM

## 2018-03-15 NOTE — DISCHARGE INSTRUCTIONS
Chest Pain, Ambulatory Care   GENERAL INFORMATION:   Chest pain  can be caused by a range of conditions, from not serious to life-threatening  It may be caused by a heart attack or a blood clot in your lungs  Sometimes chest pain or pressure is caused by poor blood flow to your heart (angina)  Infection, inflammation, or a fracture in the bones or cartilage in your chest can cause pain or discomfort  Chest pain can also be a symptom of a digestive problem, such as acid reflux or a stomach ulcer  Common symptoms include the following:   · Fever or sweating     · Nausea or vomiting     · Shortness of breath     · Discomfort or pressure that spreads from your chest to your back, jaw, or arm     · A racing or slow heartbeat     · Feeling weak, tired, or faint  Seek immediate care for the following symptoms:   · Any of the following signs of a heart attack:      ¨ Squeezing, pressure, or pain in your chest that lasts longer than 5 minutes or returns    ¨ Discomfort or pain in your back, neck, jaw, stomach, or arm     ¨ Trouble breathing     ¨ Nausea or vomiting    ¨ Lightheadedness or a sudden cold sweat, especially with trouble breathing         · Chest discomfort that gets worse, even with medicine    · Coughing or vomiting blood    · Black or bloody bowel movements     · Vomiting that does not stop, or pain when you swallow  Treatment for chest pain  may include medicine to treat your symptoms while he determines the cause of your chest pain  You may also need any of the following:  · Antiplatelets , such as aspirin, help prevent blood clots  Take your antiplatelet medicine exactly as directed  These medicines make it more likely for you to bleed or bruise  If you are told to take aspirin, do not take acetaminophen or ibuprofen instead  · Prescription pain medicine  may be given  Ask how to take this medicine safely  Do not smoke: If you smoke, it is never too late to quit   Smoking increases your risk for a heart attack and other heart and lung conditions  Ask your healthcare provider for information about how to stop smoking if you need help  Follow up with your healthcare provider as directed: You may need more tests  You may be referred to a specialist, such as a cardiologist or gastroenterologist  Write down your questions so you remember to ask them during your visits  CARE AGREEMENT:   You have the right to help plan your care  Learn about your health condition and how it may be treated  Discuss treatment options with your caregivers to decide what care you want to receive  You always have the right to refuse treatment  The above information is an  only  It is not intended as medical advice for individual conditions or treatments  Talk to your doctor, nurse or pharmacist before following any medical regimen to see if it is safe and effective for you  © 2014 [address] is for End User's use only and may not be sold, redistributed or otherwise used for commercial purposes  All illustrations and images included in CareNotes® are the copyrighted property of A D A M , Inc  or [employer]   WHAT YOU NEED TO KNOW:   Stress is a feeling of tension or strain related to the events and pressures of everyday life  Learn to cope and control your stress to help you function in a healthy way  DISCHARGE INSTRUCTIONS:   Call 911 for any of the following:   · You feel like hurting yourself or someone else  · You feel you are overwhelmed and can no longer handle things by yourself  Contact your healthcare provider if:   · You have trouble coping with your stress  · Your symptoms cause problems in your relationships  · You feel depressed  · You have trouble controlling your anger  · You have started to use alcohol, illegal drugs, or prescription medicines, or you increase your current use      · You have questions or concerns about your condition or care  Ways to manage your stress:  Learn what causes you stress  Not all stress can be avoided  Instead, change how you cope with stress by doing any of the following:  · Learn relaxation techniques, such as yoga, meditation, or listening to music  Take at least 30 minutes a day to do something you enjoy  This may include taking a bath or reading a book  · Do deep breathing exercises during times of increased stress  Sit up straight and take a slow, deep breath in through your nose  Then breathe out slowly through your mouth  Take twice as long to breathe out as you do when you breathe in  Repeat this a few times until you feel calmer or more focused  · Set realistic goals for yourself  Make a list of tasks and prioritize them  Focus on one task at a time  · Talk to someone about things that upset you  Talk to a trusted friend, family member, or support group  Try to stop yourself when you think negative, angry, or discouraging thoughts  · Take time to exercise  Start slowly, such as walking 1 to 2 blocks each day  Stretch and relax your muscles often  Ask about the best exercise plan for you  · Eat a variety of healthy foods  Healthy foods include fruits, vegetables, whole-grain breads, low-fat dairy products, beans, lean meats, and fish  Follow up with your healthcare provider as directed:  Write down your questions so you remember to ask them during your visits  © 2017 [address] Information is for End User's use only and may not be sold, redistributed or otherwise used for commercial purposes  All illustrations and images included in CareNotes® are the copyrighted property of A D [employer] M , Inc  or Guillaume Rizvi  The above information is an  only  It is not intended as medical advice for individual conditions or treatments   Talk to your doctor, nurse or pharmacist before following any medical regimen to see if it is safe and effective for you  Panic Attack   WHAT YOU NEED TO KNOW:   A panic attack is a sudden, strong feeling of fear even though you are not in danger  You also have physical symptoms such as rapid breathing or heavy sweating  Symptoms are usually worst about 10 minutes after they start and can last up to 20 minutes  You may feel like you are having a heart attack  You may have a panic attack before an event, such as a public speech you have to give  A panic attack can also happen for no clear reason  Frequent panic attacks may be a sign of a panic disorder that needs long-term treatment  DISCHARGE INSTRUCTIONS:   Return to the emergency department if:   · You have severe chest pain, shortness of breath, or irregular heartbeats  · You have thoughts of harming yourself or another person  Contact your healthcare provider if:   · You have new or worsening panic attacks after treatment  · You have questions or concerns about your condition or care  Medicines:   · Medicines  may be given to make you feel more relaxed or to reduce anxiety that causes a panic attack  Some medicines are taken only when you are having a panic attack  Other medicines can be taken to prevent panic attacks  · Take your medicine as directed  Contact your healthcare provider if you think your medicine is not helping or if you have side effects  Tell him of her if you are allergic to any medicine  Keep a list of the medicines, vitamins, and herbs you take  Include the amounts, and when and why you take them  Bring the list or the pill bottles to follow-up visits  Carry your medicine list with you in case of an emergency  Follow up with your healthcare provider as directed:  Write down your questions so you remember to ask them during your visits  Manage or prevent a panic attack:   · Manage stress  Stress can trigger a panic attack  Yoga and meditation are good ways to help manage stress   It might be helpful to talk to someone about the stress in your life  · Exercise as directed  Exercise can reduce stress and help you sleep better  Your healthcare provider can help you create an exercise plan  · Set a sleep schedule  Too little sleep can increase anxiety  Go to bed at the same time each night and wake up at the same time each morning  Keep your room quiet and free from distractions, such as a television or computer  · Limit alcohol and caffeine  Alcohol and caffeine can both increase anxiety and make it difficult for you to sleep well  A drink of alcohol is 12 ounces of beer, 5 ounces of wine, or 1½ ounces of liquor  · Eat a variety of healthy foods  Healthy foods include fruits, vegetables, low-fat dairy products, lean meats, fish, and beans  Limit sugar  Sugar can increase your symptoms  · Do not smoke  Nicotine and other chemicals in cigarettes and cigars can increase anxiety and also cause lung damage  Ask your healthcare provider for information if you currently smoke and need help to quit  E-cigarettes or smokeless tobacco still contain nicotine  Talk to your healthcare provider before you use these products  © 2017 [address] [facility] Information is for End User's use only and may not be sold, redistributed or otherwise used for commercial purposes  All illustrations and images included in CareNotes® are the copyrighted property of A D [employer] M , Inc  or Guillaume Rizvi  The above information is an  only  It is not intended as medical advice for individual conditions or treatments  Talk to your doctor, nurse or pharmacist before following any medical regimen to see if it is safe and effective for you

## 2018-08-23 RX ORDER — LOSARTAN POTASSIUM AND HYDROCHLOROTHIAZIDE 12.5; 1 MG/1; MG/1
1 TABLET ORAL DAILY
COMMUNITY

## 2018-08-23 RX ORDER — GUAIFENESIN AND CODEINE PHOSPHATE 100; 10 MG/5ML; MG/5ML
10 SOLUTION ORAL EVERY 6 HOURS
COMMUNITY
Start: 2018-07-20 | End: 2018-08-28

## 2018-08-28 ENCOUNTER — OFFICE VISIT (OUTPATIENT)
Dept: NEUROLOGY | Facility: CLINIC | Age: 73
End: 2018-08-28
Payer: COMMERCIAL

## 2018-08-28 VITALS
WEIGHT: 181 LBS | SYSTOLIC BLOOD PRESSURE: 138 MMHG | DIASTOLIC BLOOD PRESSURE: 84 MMHG | HEART RATE: 62 BPM | HEIGHT: 63 IN | BODY MASS INDEX: 32.07 KG/M2

## 2018-08-28 DIAGNOSIS — S39.012S LUMBOSACRAL STRAIN, SEQUELA: Primary | ICD-10-CM

## 2018-08-28 DIAGNOSIS — F41.9 ANXIETY: ICD-10-CM

## 2018-08-28 PROCEDURE — 99213 OFFICE O/P EST LOW 20 MIN: CPT | Performed by: PSYCHIATRY & NEUROLOGY

## 2018-08-28 NOTE — PROGRESS NOTES
Progress Note - Neurology   Klaus Gil 74 y o  male MRN: [id number]  Unit/Bed#:  Encounter: [id number]      Subjective:   Patient is here for a follow-up visit with a history of chronic low back pain, left lumbar radiculopathy which has resolved, and overall has been doing well  He also was detected to have a thoracic ascending aortic aneurysm system and was seen by vascular surgery, in the recent past   Patient is scheduled to see is gastroenterologist in the near future  He denies any new neurological symptoms except for intermittent low back pain especially with extra strenuous activities and generally uses ibuprofen for relief  He uses ibuprofen only on a p r n  basis  ROS:   Review of Systems   HENT: Negative  Eyes: Negative  Respiratory: Negative  Cardiovascular: Negative  Gastrointestinal: Negative  Endocrine: Negative  Genitourinary: Negative  Musculoskeletal: Positive for back pain and joint swelling  Skin: Negative  Allergic/Immunologic: Negative  Neurological: Positive for weakness  Hematological: Negative  Psychiatric/Behavioral: Negative  Vitals:   Vitals:    08/28/18 1227   BP: 138/84   Pulse: 62   ,Body mass index is 32 58 kg/m²  MEDS:      Current Outpatient Prescriptions:     losartan-hydrochlorothiazide (HYZAAR) 100-12 5 MG per tablet, Take 1 tablet by mouth daily, Disp: , Rfl:     metoprolol tartrate (LOPRESSOR) 25 mg tablet, TK 1/2 T PO BID, Disp: , Rfl: 3  :    Physical Exam:  General appearance: alert, appears stated age and cooperative  Head: Normocephalic, without obvious abnormality, atraumatic    Neurologic:  On examination he has evidence of mild tenderness in the sacroiliac joints bilaterally but no significant spine tenderness was noted  There is also no evidence of any new cranial nerve, motor or sensory deficits in the upper lower extremities and deep tendon reflexes are 1+ bilaterally  his gait is normal based      Lab Results: I have personally reviewed pertinent reports  Imaging Studies: I have personally reviewed pertinent reports  Assessment:  1  Chronic low back pain secondary to lumbar DJD with left lumbar radiculopathy which has resolved  Plan:  Patient is recommended to continue home exercise program, continue ibuprofen on a p r n  basis, and will return back to see me in 1 year  He is familiar with his restrictions  8/28/2018,12:38 PM    Dictation voice to text software has been used in the creation of this document  Please consider this in light of any contextual or grammatical errors

## 2019-08-12 ENCOUNTER — EVALUATION (OUTPATIENT)
Dept: PHYSICAL THERAPY | Facility: CLINIC | Age: 74
End: 2019-08-12
Payer: COMMERCIAL

## 2019-08-12 DIAGNOSIS — G89.29 CHRONIC BILATERAL LOW BACK PAIN WITH BILATERAL SCIATICA: Primary | ICD-10-CM

## 2019-08-12 DIAGNOSIS — M54.42 CHRONIC BILATERAL LOW BACK PAIN WITH BILATERAL SCIATICA: Primary | ICD-10-CM

## 2019-08-12 DIAGNOSIS — M54.41 CHRONIC BILATERAL LOW BACK PAIN WITH BILATERAL SCIATICA: Primary | ICD-10-CM

## 2019-08-12 PROCEDURE — 97162 PT EVAL MOD COMPLEX 30 MIN: CPT | Performed by: PHYSICAL THERAPIST

## 2019-08-12 NOTE — PROGRESS NOTES
PT Evaluation     Today's date: 2019  Patient name: Fabi Richter  : 1945  MRN: [id number]  Referring provider: Wilfredo Cai MD  Dx:   Encounter Diagnosis     ICD-10-CM    1  Chronic bilateral low back pain with bilateral sciatica M54 42     M54 41     G89 29        Start Time: 1500  Stop Time: 1535  Total time in clinic (min): 35 minutes    Assessment  Assessment details: Pt is a 73 y/o male presenting to physical therapy with chief complaint of LBP that has been present for the past couple months  Pt presents with decreased lumbar AROM in all directions, with pain in B SB and upon returning to standing from flexion  Pt's subjective reports suggest a flexion biased, as sitting and DKTC improve his pain  In IE, flexion or extension had no effect on pain  Pt's BLE strength is grossly 4/5 and pain free  Pt would benefit from physical therapy in order to improve pain, AROM, flexibility, and overall function  Impairments: abnormal gait, abnormal or restricted ROM, activity intolerance, impaired balance, impaired physical strength, lacks appropriate home exercise program, pain with function and poor posture   Functional limitations: prolonged standing/walking, stairs  Symptom irritability: moderateUnderstanding of Dx/Px/POC: good   Prognosis: good    Goals  STG: 3 weeks  1  Pt will demonstrate independence with HEP  2  Pt will improve lumbar AROM by 10%  3  Pt will improve BLE strength by at least 1/2 grade  4  Pt will report pain no more than 5/10  5  Pt will generate proper TA contraction without cuing to show improved NM control    LT weeks  1  Pt will improve lumbar AROM to at least 80% in all directions  2  Pt will report pain no more than 2/10  3  Pt will be able to stand for at least 30 minutes without pain to return to PLOF  4  Pt will be able to lift at least 10lbs from floor to waist without pain        Plan  Patient would benefit from: skilled physical therapy  Planned modality interventions: cryotherapy and thermotherapy: hydrocollator packs  Planned therapy interventions: therapeutic exercise, therapeutic activities, stretching, strengthening, patient education, neuromuscular re-education, massage, manual therapy, balance, gait training and home exercise program  Frequency: 2x week  Duration in weeks: 6  Treatment plan discussed with: patient        Subjective Evaluation    History of Present Illness  Mechanism of injury: Pt reports his back began hurting a couple months ago, on and off  Pt reports his pain is worse when he is walking  He reports he gets the most pain when he is in one position for a long time (cooking or washing dished), and sitting or lying improves the pain  He denies pain with sleeping or turning in bed  He reports he has pain when he gets up, but the more he moves around it goes away  He would like to be able to get back to the gym     Quality of life: good    Pain  Current pain ratin  At best pain ratin  At worst pain ratin  Quality: dull ache  Aggravating factors: standing, walking, stair climbing and lifting          Objective     Tenderness     Additional Tenderness Details  TTP throughout B lumbar paraspinals    Neurological Testing     Reflexes   Left   Patellar (L4): normal (2+)  Achilles (S1): normal (2+)    Right   Patellar (L4): normal (2+)  Achilles (S1): trace (1+)    Active Range of Motion     Lumbar   Flexion:  Restriction level: minimal  Extension:  Restriction level: minimal  Left lateral flexion:  WFL and with pain  Right lateral flexion:  WFL and with pain    Additional Active Range of Motion Details  Pain with returning to standing following flexion AROM    Joint Play     Pain: L2, L3, L4 and L5   Mechanical Assessment    Cervical      Thoracic      Lumbar    Lying flexion: repeated movements  Pain location: no change  Lying extension: repeated movements  Pain location: no change    Strength/Myotome Testing     Left Hip Planes of Motion   Flexion: 4    Right Hip   Planes of Motion   Flexion: 4    Left Knee   Flexion: 4  Extension: 4    Right Knee   Flexion: 4  Extension: 4    Left Ankle/Foot   Dorsiflexion: 4    Right Ankle/Foot   Dorsiflexion: 4    General Comments:      Lumbar Comments  Hamstring flexibility: moderate restriction bilaterally             Precautions: HTN, anxiety      Manual  8/12                                                                                 Exercise Diary  8/12            Bike             HS stretch             Piriformis stretch             Figure 4 stretch             SKTC             DKTC             Seated PB roll out 3-way             Seated lumbar flexion                                                                                                                                                                             Modalities

## 2019-08-12 NOTE — LETTER
2019    Tanner Moreno MD  [address] [address]    Patient: Jeff Mckeon   YOB: 1945   Date of Visit: 2019     Encounter Diagnosis     ICD-10-CM    1  Chronic bilateral low back pain with bilateral sciatica M54 42     M54 41     G89 29        Dear Dr Leigh Ann Green:    Thank you for your recent referral of Jeff Mckeon  Please review the attached evaluation summary from Young's recent visit  Please verify that you agree with the plan of care by signing the attached order  If you have any questions or concerns, please do not hesitate to call  I sincerely appreciate the opportunity to share in the care of one of your patients and hope to have another opportunity to work with you in the near future  Sincerely,    Lindy Wilder, PT      Referring Provider:      I certify that I have read the below Plan of Care and certify the need for these services furnished under this plan of treatment while under my care  Tanner Moreno MD  [address]  [address] [facility] [phone]          PT Evaluation     Today's date: 2019  Patient name: Jeff Mckeon  : 1945  MRN: [id number]  Referring provider: Radha Nieto MD  Dx:   Encounter Diagnosis     ICD-10-CM    1  Chronic bilateral low back pain with bilateral sciatica M54 42     M54 41     G89 29        Start Time: 1500  Stop Time: 1535  Total time in clinic (min): 35 minutes    Assessment  Assessment details: Pt is a 73 y/o male presenting to physical therapy with chief complaint of LBP that has been present for the past couple months  Pt presents with decreased lumbar AROM in all directions, with pain in B SB and upon returning to standing from flexion  Pt's subjective reports suggest a flexion biased, as sitting and DKTC improve his pain  In IE, flexion or extension had no effect on pain   Pt's BLE strength is grossly 4/5 and pain free  Pt would benefit from physical therapy in order to improve pain, AROM, flexibility, and overall function  Impairments: abnormal gait, abnormal or restricted ROM, activity intolerance, impaired balance, impaired physical strength, lacks appropriate home exercise program, pain with function and poor posture   Functional limitations: prolonged standing/walking, stairs  Symptom irritability: moderateUnderstanding of Dx/Px/POC: good   Prognosis: good    Goals  STG: 3 weeks  1  Pt will demonstrate independence with HEP  2  Pt will improve lumbar AROM by 10%  3  Pt will improve BLE strength by at least 1/2 grade  4  Pt will report pain no more than 5/10  5  Pt will generate proper TA contraction without cuing to show improved NM control    LT weeks  1  Pt will improve lumbar AROM to at least 80% in all directions  2  Pt will report pain no more than 2/10  3  Pt will be able to stand for at least 30 minutes without pain to return to PLOF  4  Pt will be able to lift at least 10lbs from floor to waist without pain  Plan  Patient would benefit from: skilled physical therapy  Planned modality interventions: cryotherapy and thermotherapy: hydrocollator packs  Planned therapy interventions: therapeutic exercise, therapeutic activities, stretching, strengthening, patient education, neuromuscular re-education, massage, manual therapy, balance, gait training and home exercise program  Frequency: 2x week  Duration in weeks: 6  Treatment plan discussed with: patient        Subjective Evaluation    History of Present Illness  Mechanism of injury: Pt reports his back began hurting a couple months ago, on and off  Pt reports his pain is worse when he is walking  He reports he gets the most pain when he is in one position for a long time (cooking or washing dished), and sitting or lying improves the pain  He denies pain with sleeping or turning in bed   He reports he has pain when he gets up, but the more he moves around it goes away  He would like to be able to get back to the gym     Quality of life: good    Pain  Current pain ratin  At best pain ratin  At worst pain ratin  Quality: dull ache  Aggravating factors: standing, walking, stair climbing and lifting          Objective     Tenderness     Additional Tenderness Details  TTP throughout B lumbar paraspinals    Neurological Testing     Reflexes   Left   Patellar (L4): normal (2+)  Achilles (S1): normal (2+)    Right   Patellar (L4): normal (2+)  Achilles (S1): trace (1+)    Active Range of Motion     Lumbar   Flexion:  Restriction level: minimal  Extension:  Restriction level: minimal  Left lateral flexion:  WFL and with pain  Right lateral flexion:  WFL and with pain    Additional Active Range of Motion Details  Pain with returning to standing following flexion AROM    Joint Play     Pain: L2, L3, L4 and L5   Mechanical Assessment    Cervical      Thoracic      Lumbar    Lying flexion: repeated movements  Pain location: no change  Lying extension: repeated movements  Pain location: no change    Strength/Myotome Testing     Left Hip   Planes of Motion   Flexion: 4    Right Hip   Planes of Motion   Flexion: 4    Left Knee   Flexion: 4  Extension: 4    Right Knee   Flexion: 4  Extension: 4    Left Ankle/Foot   Dorsiflexion: 4    Right Ankle/Foot   Dorsiflexion: 4    General Comments:      Lumbar Comments  Hamstring flexibility: moderate restriction bilaterally             Precautions: HTN, anxiety      Manual                                                                                   Exercise Diary              Bike             HS stretch             Piriformis stretch             Figure 4 stretch             SKTC             DKTC             Seated PB roll out 3-way             Seated lumbar flexion Modalities

## 2019-08-13 ENCOUNTER — TRANSCRIBE ORDERS (OUTPATIENT)
Dept: PHYSICAL THERAPY | Facility: CLINIC | Age: 74
End: 2019-08-13

## 2019-08-13 ENCOUNTER — OFFICE VISIT (OUTPATIENT)
Dept: PHYSICAL THERAPY | Facility: CLINIC | Age: 74
End: 2019-08-13
Payer: COMMERCIAL

## 2019-08-13 DIAGNOSIS — M54.42 CHRONIC BILATERAL LOW BACK PAIN WITH BILATERAL SCIATICA: Primary | ICD-10-CM

## 2019-08-13 DIAGNOSIS — G89.29 CHRONIC BILATERAL LOW BACK PAIN WITH BILATERAL SCIATICA: Primary | ICD-10-CM

## 2019-08-13 DIAGNOSIS — M54.41 CHRONIC BILATERAL LOW BACK PAIN WITH BILATERAL SCIATICA: Primary | ICD-10-CM

## 2019-08-13 PROCEDURE — 97110 THERAPEUTIC EXERCISES: CPT

## 2019-08-13 PROCEDURE — 97112 NEUROMUSCULAR REEDUCATION: CPT

## 2019-08-13 NOTE — PROGRESS NOTES
Daily Note     Today's date: 2019  Patient name: Blayne Tran  : 1945  MRN: [id number]  Referring provider: Sharlene Pineda MD  Dx:   Encounter Diagnosis     ICD-10-CM    1  Chronic bilateral low back pain with bilateral sciatica M54 42     M54 41     G89 29                   Subjective: Pt reports that he is doing much better today upon session noting no pain  Notes that the exercises that he has been doing really have been helping  Objective: See treatment diary below      Assessment: Tolerated treatment well continuing to focus on a flexion based approach with no increased symptoms noted  He had no pain in the low back as well as negative for radiating symptoms  Patient demonstrated fatigue post treatment, exhibited good technique with therapeutic exercises and would benefit from continued PT      Plan: Continue per plan of care        Precautions: HTN, anxiety      Manual                                                                                   Exercise Diary             Bike  5'           HS stretch  30"x3ea           Piriformis stretch  30"x3 ea           Figure 4 stretch  30"x3 ea           SKTC  10"x 10           DKTC  10"x 10           Seated PB roll out 3-way  5"x10 ea           Seated lumbar flexion  5"x15                                                                                                                                                                           Modalities

## 2019-08-20 ENCOUNTER — APPOINTMENT (OUTPATIENT)
Dept: PHYSICAL THERAPY | Facility: CLINIC | Age: 74
End: 2019-08-20
Payer: COMMERCIAL

## 2019-08-22 ENCOUNTER — APPOINTMENT (OUTPATIENT)
Dept: PHYSICAL THERAPY | Facility: CLINIC | Age: 74
End: 2019-08-22
Payer: COMMERCIAL

## 2019-08-27 ENCOUNTER — APPOINTMENT (OUTPATIENT)
Dept: PHYSICAL THERAPY | Facility: CLINIC | Age: 74
End: 2019-08-27
Payer: COMMERCIAL

## 2019-08-28 ENCOUNTER — OFFICE VISIT (OUTPATIENT)
Dept: NEUROLOGY | Facility: CLINIC | Age: 74
End: 2019-08-28
Payer: COMMERCIAL

## 2019-08-28 VITALS
BODY MASS INDEX: 29.95 KG/M2 | HEART RATE: 52 BPM | SYSTOLIC BLOOD PRESSURE: 164 MMHG | WEIGHT: 169 LBS | HEIGHT: 63 IN | DIASTOLIC BLOOD PRESSURE: 90 MMHG

## 2019-08-28 DIAGNOSIS — M47.26 OSTEOARTHRITIS OF SPINE WITH RADICULOPATHY, LUMBAR REGION: Primary | ICD-10-CM

## 2019-08-28 PROCEDURE — 99213 OFFICE O/P EST LOW 20 MIN: CPT | Performed by: PSYCHIATRY & NEUROLOGY

## 2019-08-28 RX ORDER — HYDROXYZINE PAMOATE 25 MG/1
CAPSULE ORAL
Refills: 0 | COMMUNITY
Start: 2019-07-31

## 2019-08-28 NOTE — PROGRESS NOTES
Progress Note - Neurology   Blayne Tran 74 y o  male MRN: [id number]  Unit/Bed#:  Encounter: [id number]      Subjective:   Patient is here for a follow-up visit with a history of positional vertigo, history of chronic low back pain, and recently had worsening low back pain for which she was seen by his PCP and referred for physical therapy and since then has been on his home exercise program and his back pain is resolved  He denies any new neurological symptoms he denies any pain radiating down the lower extremities or motor or sensory symptoms in the lower extremities and denies any bladder bowel symptoms  Overall has been doing well except for his anxiety level since his wife was recently diagnosed to have breast cancer  ROS:   Review of Systems   Constitutional: Negative  Negative for appetite change and fever  HENT: Negative  Negative for hearing loss, tinnitus, trouble swallowing and voice change  Eyes: Negative  Negative for photophobia and pain  Respiratory: Negative  Negative for shortness of breath  Cardiovascular: Negative  Negative for chest pain and palpitations  Gastrointestinal: Negative  Negative for abdominal pain, constipation, nausea and vomiting  Endocrine: Negative  Negative for cold intolerance and heat intolerance  Genitourinary: Negative  Negative for dysuria, frequency and urgency  Musculoskeletal: Positive for back pain  Negative for myalgias and neck pain  Skin: Negative  Negative for rash  Neurological: Negative  Negative for dizziness, tremors, seizures, syncope, facial asymmetry, speech difficulty, weakness, light-headedness, numbness and headaches  Hematological: Negative  Does not bruise/bleed easily  Psychiatric/Behavioral: Positive for sleep disturbance  Negative for confusion and hallucinations  The patient is nervous/anxious          Vitals:   Vitals:    08/28/19 1123   BP: 164/90   BP Location: Left arm   Patient Position: Sitting Cuff Size: Adult   Pulse: (!) 52   Weight: 81 6 kg (180 lb)   Height: 5' 2 5" (1 588 m)   ,Body mass index is 32 4 kg/m²  MEDS:      Current Outpatient Medications:     hydrOXYzine pamoate (VISTARIL) 25 mg capsule, Take by mouth daily at bedtime , Disp: , Rfl: 0    losartan-hydrochlorothiazide (HYZAAR) 100-12 5 MG per tablet, Take 1 tablet by mouth daily, Disp: , Rfl:     metoprolol tartrate (LOPRESSOR) 25 mg tablet, TK 1/2 T PO BID, Disp: , Rfl: 3  :    Physical Exam:  General appearance: alert, appears stated age and cooperative  Head: Normocephalic, without obvious abnormality, atraumatic    On examination there is no evidence of any focal cranial nerve, motor or sensory deficits in the upper or lower extremities, deep tendon reflexes are symmetric, no evidence of any dysmetria was noted, no bruits were appreciable in the neck, and there is no evidence of any spine tenderness  SLR is negative bilaterally  His gait is normal based  Lab Results: I have personally reviewed pertinent reports  Imaging Studies: I have personally reviewed pertinent reports  Assessment:  1  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

## 2019-08-29 ENCOUNTER — APPOINTMENT (OUTPATIENT)
Dept: PHYSICAL THERAPY | Facility: CLINIC | Age: 74
End: 2019-08-29
Payer: COMMERCIAL

## 2019-09-03 NOTE — PROGRESS NOTES
PT Discharge    Today's date: 9/3/2019  Patient name: Jeff Mckeon  : 1945  MRN: [id number]  Referring provider: Radha Nieto MD  Dx:   Encounter Diagnosis     ICD-10-CM    1  Chronic bilateral low back pain with bilateral sciatica M54 42     M54 41     G89 29    Assessment  Assessment details: Pt called to cancel all future appointments, stating he is feeling better  Subjective and objective information and goals unable to be updated at this time  Pt DC from skilled therapy  Impairments: abnormal gait, abnormal or restricted ROM, activity intolerance, impaired balance, impaired physical strength, lacks appropriate home exercise program, pain with function and poor posture   Functional limitations: prolonged standing/walking, stairs  Symptom irritability: moderateUnderstanding of Dx/Px/POC: good   Prognosis: good    Goals  STG: 3 weeks - not met  1  Pt will demonstrate independence with HEP  2  Pt will improve lumbar AROM by 10%  3  Pt will improve BLE strength by at least 1/2 grade  4  Pt will report pain no more than 5/10  5  Pt will generate proper TA contraction without cuing to show improved NM control    LT weeks - not met  1  Pt will improve lumbar AROM to at least 80% in all directions  2  Pt will report pain no more than 2/10  3  Pt will be able to stand for at least 30 minutes without pain to return to PLOF  4  Pt will be able to lift at least 10lbs from floor to waist without pain        Plan  Patient would benefit from: skilled physical therapy  Planned modality interventions: cryotherapy and thermotherapy: hydrocollator packs  Planned therapy interventions: therapeutic exercise, therapeutic activities, stretching, strengthening, patient education, neuromuscular re-education, massage, manual therapy, balance, gait training and home exercise program        Subjective Evaluation    History of Present Illness  Mechanism of injury: Pt reports his back began hurting a couple months ago, on and off  Pt reports his pain is worse when he is walking  He reports he gets the most pain when he is in one position for a long time (cooking or washing dished), and sitting or lying improves the pain  He denies pain with sleeping or turning in bed  He reports he has pain when he gets up, but the more he moves around it goes away  He would like to be able to get back to the gym     Quality of life: good    Pain  Current pain ratin  At best pain ratin  At worst pain ratin  Quality: dull ache  Aggravating factors: standing, walking, stair climbing and lifting          Objective     Tenderness     Additional Tenderness Details  TTP throughout B lumbar paraspinals    Neurological Testing     Reflexes   Left   Patellar (L4): normal (2+)  Achilles (S1): normal (2+)    Right   Patellar (L4): normal (2+)  Achilles (S1): trace (1+)    Active Range of Motion     Lumbar   Flexion:  Restriction level: minimal  Extension:  Restriction level: minimal  Left lateral flexion:  WFL and with pain  Right lateral flexion:  WFL and with pain    Additional Active Range of Motion Details  Pain with returning to standing following flexion AROM    Joint Play     Pain: L2, L3, L4 and L5   Mechanical Assessment    Cervical      Thoracic      Lumbar    Lying flexion: repeated movements  Pain location: no change  Lying extension: repeated movements  Pain location: no change    Strength/Myotome Testing     Left Hip   Planes of Motion   Flexion: 4    Right Hip   Planes of Motion   Flexion: 4    Left Knee   Flexion: 4  Extension: 4    Right Knee   Flexion: 4  Extension: 4    Left Ankle/Foot   Dorsiflexion: 4    Right Ankle/Foot   Dorsiflexion: 4    General Comments:      Lumbar Comments  Hamstring flexibility: moderate restriction bilaterally

## 2021-02-11 RX ORDER — ACETAMINOPHEN 500 MG
500 TABLET ORAL EVERY 6 HOURS PRN
COMMUNITY

## 2021-02-11 RX ORDER — HYDROXYZINE HYDROCHLORIDE 25 MG/1
25-50 TABLET, FILM COATED ORAL DAILY PRN
COMMUNITY
Start: 2020-10-19

## 2021-02-11 RX ORDER — TRAZODONE HYDROCHLORIDE 50 MG/1
50 TABLET ORAL
COMMUNITY
Start: 2021-02-04

## 2021-02-11 RX ORDER — OLMESARTAN MEDOXOMIL 40 MG/1
40 TABLET ORAL DAILY
COMMUNITY
Start: 2020-10-15

## 2021-02-11 RX ORDER — CLOPIDOGREL BISULFATE 75 MG/1
75 TABLET ORAL DAILY
COMMUNITY
Start: 2021-02-03 | End: 2022-02-03

## 2021-02-11 RX ORDER — PHENOL 1.4 %
AEROSOL, SPRAY (ML) MUCOUS MEMBRANE
COMMUNITY

## 2021-02-11 RX ORDER — AMLODIPINE BESYLATE 5 MG/1
5 TABLET ORAL DAILY
COMMUNITY
Start: 2021-02-04

## 2021-02-12 ENCOUNTER — OFFICE VISIT (OUTPATIENT)
Dept: GASTROENTEROLOGY | Facility: CLINIC | Age: 76
End: 2021-02-12
Payer: COMMERCIAL

## 2021-02-12 VITALS
SYSTOLIC BLOOD PRESSURE: 130 MMHG | DIASTOLIC BLOOD PRESSURE: 78 MMHG | HEIGHT: 63 IN | BODY MASS INDEX: 26.86 KG/M2 | WEIGHT: 151.6 LBS | HEART RATE: 62 BPM

## 2021-02-12 DIAGNOSIS — Z86.010 HISTORY OF COLON POLYPS: Primary | ICD-10-CM

## 2021-02-12 DIAGNOSIS — K21.9 GASTROESOPHAGEAL REFLUX DISEASE WITHOUT ESOPHAGITIS: ICD-10-CM

## 2021-02-12 PROCEDURE — 99214 OFFICE O/P EST MOD 30 MIN: CPT | Performed by: INTERNAL MEDICINE

## 2021-02-12 RX ORDER — DOXYCYCLINE 100 MG/1
TABLET ORAL
COMMUNITY
Start: 2020-11-19

## 2021-02-12 RX ORDER — DIPHENOXYLATE HYDROCHLORIDE AND ATROPINE SULFATE 2.5; .025 MG/1; MG/1
1 TABLET ORAL DAILY
COMMUNITY

## 2021-02-12 NOTE — PROGRESS NOTES
[facility] - Outpatient Follow-up Note  Kennedi Billings 66 y o  male MRN: [id number]  Encounter: [id number]          ASSESSMENT AND PLAN:      1  History of colon polyps    2  Gastroesophageal reflux disease without esophagitis    Schedule colonoscopy with bowel prep    Continue the nexium prn    ______________________________________________________________________    SUBJECTIVE:    The patient comes to the office today for routine follow-up and this surveillance colonoscopy  He has a history of several polyps identified in November of 2018 which were adenomas  He denies any abdominal pain, nausea, vomiting, diarrhea, constipation, rectal bleeding, hematemesis, melena, change in the bowel habits, bloating, or gaseousness  He does admit to heartburn occasionally  He is taking his Nexium on an as-needed basis only when the heartburn occurs  This is not a frequent heartburn  There is no associated dysphagia or odynophagia  REVIEW OF SYSTEMS IS OTHERWISE NEGATIVE        Historical Information   Past Medical History:   Diagnosis Date    Anxiety     Coronary artery disease     Hypertension     Leg weakness     Lumbar radiculopathy      Past Surgical History:   Procedure Laterality Date    CORONARY STENT PLACEMENT  04/2020    GANGLION CYST EXCISION      HERNIA REPAIR       Social History   Social History     Substance and Sexual Activity   Alcohol Use Yes    Comment: rare     Social History     Substance and Sexual Activity   Drug Use No     Social History     Tobacco Use   Smoking Status Never Smoker   Smokeless Tobacco Never Used     Family History   Problem Relation Age of Onset    Breast cancer Mother        Meds/Allergies       Current Outpatient Medications:     acetaminophen (TYLENOL) 500 mg tablet    amLODIPine (NORVASC) 5 mg tablet    aspirin (ECOTRIN LOW STRENGTH) 81 mg EC tablet    atorvastatin (LIPITOR) 40 mg tablet    clopidogrel (PLAVIX) 75 mg tablet   esomeprazole (NexIUM) 20 mg capsule    hydrOXYzine HCL (ATARAX) 25 mg tablet    Melatonin 10 MG TABS    multivitamin (THERAGRAN) TABS    olmesartan (BENICAR) 40 mg tablet    Omega-3 Fatty Acids (FISH OIL OMEGA-3 PO)    traZODone (DESYREL) 50 mg tablet    doxycycline (ADOXA) 100 MG tablet    hydrOXYzine pamoate (VISTARIL) 25 mg capsule    losartan-hydrochlorothiazide (HYZAAR) 100-12 5 MG per tablet    metoprolol tartrate (LOPRESSOR) 25 mg tablet    ticagrelor (BRILINTA) 90 MG    Allergies   Allergen Reactions    Escitalopram Anxiety           Objective     Blood pressure 130/78, pulse 62, height 5' 2 5" (1 588 m), weight 68 8 kg (151 lb 9 6 oz)  Body mass index is 27 29 kg/m²  PHYSICAL EXAM:      General Appearance:   Alert, cooperative, no distress   HEENT:   Normocephalic, atraumatic, anicteric      Neck:  Supple, symmetrical, trachea midline   Lungs:   Clear to auscultation bilaterally; no rales, rhonchi or wheezing; respirations unlabored    Heart[de-identified]   Regular rate and rhythm; no murmur, rub, or gallop  Abdomen:   Soft, non-tender, non-distended; normal bowel sounds; no masses, no organomegaly    Genitalia:   Deferred    Rectal:   Deferred    Extremities:  No cyanosis, clubbing or edema    Pulses:  2+ and symmetric    Skin:  No jaundice, rashes, or lesions    Lymph nodes:  No palpable cervical lymphadenopathy        Lab Results:   No visits with results within 1 Day(s) from this visit     Latest known visit with results is:   Admission on 03/14/2018, Discharged on 03/14/2018   Component Date Value    WBC 03/14/2018 11 55*    RBC 03/14/2018 4 35     Hemoglobin 03/14/2018 14 4     Hematocrit 03/14/2018 42 1     MCV 03/14/2018 97     MCH 03/14/2018 33 1     MCHC 03/14/2018 34 2     RDW 03/14/2018 12 0     MPV 03/14/2018 10 6     Platelets 64/20/4909 276     nRBC 03/14/2018 0     Neutrophils Relative 03/14/2018 61     Lymphocytes Relative 03/14/2018 28     Monocytes Relative 03/14/2018 7     Eosinophils Relative 03/14/2018 4     Basophils Relative 03/14/2018 0     Neutrophils Absolute 03/14/2018 7 03     Lymphocytes Absolute 03/14/2018 3 25     Monocytes Absolute 03/14/2018 0 78     Eosinophils Absolute 03/14/2018 0 41     Basophils Absolute 03/14/2018 0 05     Sodium 03/14/2018 140     Potassium 03/14/2018 3 7     Chloride 03/14/2018 105     CO2 03/14/2018 29     ANION GAP 03/14/2018 6     BUN 03/14/2018 21     Creatinine 03/14/2018 1 15     Glucose 03/14/2018 120     Calcium 03/14/2018 8 2*    AST 03/14/2018 19     ALT 03/14/2018 25     Alkaline Phosphatase 03/14/2018 62     Total Protein 03/14/2018 6 8     Albumin 03/14/2018 3 3*    Total Bilirubin 03/14/2018 0 40     eGFR 03/14/2018 62     Troponin I 03/14/2018 <0 02     Troponin I 03/14/2018 <0 02     Ventricular Rate 03/14/2018 72     Atrial Rate 03/14/2018 72     TX Interval 03/14/2018 166     QRSD Interval 03/14/2018 106     QT Interval 03/14/2018 394     QTC Interval 03/14/2018 431     P Axis 03/14/2018 50     QRS Axis 03/14/2018 -40     T Wave Axis 03/14/2018 15          Radiology Results:   No results found

## 2021-02-16 ENCOUNTER — TELEPHONE (OUTPATIENT)
Dept: GASTROENTEROLOGY | Facility: CLINIC | Age: 76
End: 2021-02-16

## 2021-04-05 NOTE — TELEPHONE ENCOUNTER
Patient called wanted to know if he can move up his procedure - concerned abut his blood thinner   Please call Frederick Carrasquillo at [phone]

## 2021-04-05 NOTE — TELEPHONE ENCOUNTER
Nothing available as of now before then he should continue blood thinner until a few days before procedure then stop

## 2021-04-28 ENCOUNTER — HOSPITAL ENCOUNTER (OUTPATIENT)
Dept: GASTROENTEROLOGY | Facility: HOSPITAL | Age: 76
Setting detail: OUTPATIENT SURGERY
Discharge: HOME | End: 2021-04-28
Attending: INTERNAL MEDICINE
Payer: COMMERCIAL

## 2021-04-28 ENCOUNTER — ANESTHESIA (OUTPATIENT)
Dept: GASTROENTEROLOGY | Facility: HOSPITAL | Age: 76
End: 2021-04-28

## 2021-04-28 ENCOUNTER — ANESTHESIA EVENT (OUTPATIENT)
Dept: GASTROENTEROLOGY | Facility: HOSPITAL | Age: 76
End: 2021-04-28

## 2021-04-28 VITALS
WEIGHT: 143.52 LBS | TEMPERATURE: 98.8 F | DIASTOLIC BLOOD PRESSURE: 69 MMHG | BODY MASS INDEX: 23.91 KG/M2 | HEIGHT: 65 IN | OXYGEN SATURATION: 97 % | HEART RATE: 68 BPM | RESPIRATION RATE: 16 BRPM | SYSTOLIC BLOOD PRESSURE: 110 MMHG

## 2021-04-28 DIAGNOSIS — Z86.010 HISTORY OF COLON POLYPS: ICD-10-CM

## 2021-04-28 PROCEDURE — G0105 COLORECTAL SCRN; HI RISK IND: HCPCS | Performed by: INTERNAL MEDICINE

## 2021-04-28 RX ORDER — SODIUM CHLORIDE, SODIUM LACTATE, POTASSIUM CHLORIDE, CALCIUM CHLORIDE 600; 310; 30; 20 MG/100ML; MG/100ML; MG/100ML; MG/100ML
125 INJECTION, SOLUTION INTRAVENOUS CONTINUOUS
Status: DISCONTINUED | OUTPATIENT
Start: 2021-04-28 | End: 2021-05-02 | Stop reason: HOSPADM

## 2021-04-28 RX ORDER — PROPOFOL 10 MG/ML
INJECTION, EMULSION INTRAVENOUS AS NEEDED
Status: DISCONTINUED | OUTPATIENT
Start: 2021-04-28 | End: 2021-04-28

## 2021-04-28 RX ORDER — LIDOCAINE HYDROCHLORIDE 10 MG/ML
INJECTION, SOLUTION EPIDURAL; INFILTRATION; INTRACAUDAL; PERINEURAL AS NEEDED
Status: DISCONTINUED | OUTPATIENT
Start: 2021-04-28 | End: 2021-04-28

## 2021-04-28 RX ADMIN — LIDOCAINE HYDROCHLORIDE 50 MG: 10 INJECTION, SOLUTION EPIDURAL; INFILTRATION; INTRACAUDAL; PERINEURAL at 13:48

## 2021-04-28 RX ADMIN — PHENYLEPHRINE HYDROCHLORIDE 100 MCG: 10 INJECTION INTRAVENOUS at 14:06

## 2021-04-28 RX ADMIN — PHENYLEPHRINE HYDROCHLORIDE 100 MCG: 10 INJECTION INTRAVENOUS at 13:54

## 2021-04-28 RX ADMIN — SODIUM CHLORIDE, SODIUM LACTATE, POTASSIUM CHLORIDE, AND CALCIUM CHLORIDE 125 ML/HR: .6; .31; .03; .02 INJECTION, SOLUTION INTRAVENOUS at 12:57

## 2021-04-28 RX ADMIN — PROPOFOL 120 MG: 10 INJECTION, EMULSION INTRAVENOUS at 13:48

## 2021-04-28 RX ADMIN — PROPOFOL 30 MG: 10 INJECTION, EMULSION INTRAVENOUS at 13:50

## 2021-04-28 NOTE — H&P
History and Physical -  [facility]  Lee Ann Miranda 66 y o  male MRN: [id number]      HPI: Lee Ann Miranda is a 66y o  year old male who presents for a history of colon polyps      REVIEW OF SYSTEMS: Per the HPI, and otherwise unremarkable  Historical Information   Past Medical History:   Diagnosis Date    Anxiety     Coronary artery disease     Hypertension     Leg weakness     Lumbar radiculopathy      Past Surgical History:   Procedure Laterality Date    CORONARY STENT PLACEMENT  04/2020    GANGLION CYST EXCISION      HERNIA REPAIR       Social History   Social History     Substance and Sexual Activity   Alcohol Use Yes    Comment: rare     Social History     Substance and Sexual Activity   Drug Use No     Social History     Tobacco Use   Smoking Status Never Smoker   Smokeless Tobacco Never Used     Family History   Problem Relation Age of Onset    Breast cancer Mother        Meds/Allergies     (Not in a hospital admission)      Allergies   Allergen Reactions    Escitalopram Anxiety       Objective     Blood pressure 163/83, pulse 74, temperature 98 7 °F (37 1 °C), temperature source Temporal, resp  rate 16, height 5' 5" (1 651 m), weight 65 1 kg (143 lb 8 3 oz), SpO2 98 %  PHYSICAL EXAM    Gen: NAD  CV: RRR  CHEST: Clear  ABD: soft, NT/ND  EXT: no edema      ASSESSMENT/PLAN:  This is a 66y o  year old male here for colonoscopy, and he is stable and optimized for his procedure

## 2021-04-28 NOTE — ANESTHESIA PREPROCEDURE EVALUATION
Procedure:  COLONOSCOPY    Relevant Problems   CARDIO   (+) Ascending aortic aneurysm (HCC)   (+) Hypertension      NEURO/PSYCH   (+) Anxiety   (+) Lower extremity weakness        Physical Exam    Airway    Mallampati score: III  TM Distance: >3 FB  Neck ROM: full     Dental       Cardiovascular  Rhythm: regular, Rate: normal, Cardiovascular exam normal    Pulmonary  Pulmonary exam normal Breath sounds clear to auscultation,     Other Findings        Anesthesia Plan  ASA Score- 3     Anesthesia Type- IV sedation with anesthesia with ASA Monitors  Additional Monitors:   Airway Plan:           Plan Factors-Exercise tolerance (METS): >4 METS  Chart reviewed  Patient is not a current smoker  Patient instructed to abstain from smoking on day of procedure  Patient did not smoke on day of surgery  There is medical exclusion for perioperative obstructive sleep apnea risk education  Induction- intravenous  Postoperative Plan-     Informed Consent- Anesthetic plan and risks discussed with patient  I personally reviewed this patient with the CRNA  Discussed and agreed on the Anesthesia Plan with the CRNA  Praveena Phillip

## 2021-05-03 NOTE — ANESTHESIA POSTPROCEDURE EVALUATION
Post-Op Assessment Note    CV Status:  Stable  Pain Score: 0    Pain management: adequate     Mental Status:  Alert   Hydration Status:  Stable   PONV Controlled:  None   Airway Patency:  Patent      Post Op Vitals Reviewed: Yes      Staff: Anesthesiologist         No complications documented      BP      Temp      Pulse     Resp      SpO2

## 2021-09-02 ENCOUNTER — TELEPHONE (OUTPATIENT)
Dept: GASTROENTEROLOGY | Facility: CLINIC | Age: 76
End: 2021-09-02

## 2021-09-30 ENCOUNTER — OFFICE VISIT (OUTPATIENT)
Dept: GASTROENTEROLOGY | Facility: CLINIC | Age: 76
End: 2021-09-30
Payer: COMMERCIAL

## 2021-09-30 VITALS
SYSTOLIC BLOOD PRESSURE: 130 MMHG | HEART RATE: 67 BPM | DIASTOLIC BLOOD PRESSURE: 78 MMHG | HEIGHT: 65 IN | WEIGHT: 148 LBS | BODY MASS INDEX: 24.66 KG/M2

## 2021-09-30 DIAGNOSIS — R10.13 EPIGASTRIC PAIN: Primary | ICD-10-CM

## 2021-09-30 DIAGNOSIS — K21.9 GASTROESOPHAGEAL REFLUX DISEASE, UNSPECIFIED WHETHER ESOPHAGITIS PRESENT: ICD-10-CM

## 2021-09-30 PROCEDURE — 99213 OFFICE O/P EST LOW 20 MIN: CPT | Performed by: PHYSICIAN ASSISTANT

## 2021-09-30 RX ORDER — GABAPENTIN 100 MG/1
100 CAPSULE ORAL
COMMUNITY
Start: 2021-08-30

## 2021-09-30 NOTE — PROGRESS NOTES
[facility] - Outpatient Follow-up Note  Rosibel Radha 66 y o  male MRN: [id number]  Encounter: [id number]          ASSESSMENT AND PLAN:      1  Epigastric pain  2  Gastroesophageal reflux disease, unspecified whether esophagitis present   he has a long history of acid reflux which occurs randomly -  He utilizes Nexium 20 mg p r n  for this with good relief   recently he notes a warm sensation in his upper abdomen sometimes after eating -  He is concerned about the possibility of an ulcer     will plan EGD   if inflammation or ulceration is present he understands he will need to take Nexium daily     he had a negative colonoscopy in April of this year    ______________________________________________________________________    SUBJECTIVE:   15-year-old male presents for evaluation prior to scheduling an EGD  He reports a long history of acid reflux for which she uses Nexium as needed  This is always worked for him  Over the past few weeks he has noted a warm sensation in his upper abdomen  He has become concerned that he may have a stomach ulcer  He denies any severe pain, nausea, vomiting, hematemesis or melena  He is not taking excessive amounts of NSAIDs  He does not drink alcohol  He does take Plavix and aspirin as he has a coronary artery stent which was placed in April of 2020  He had an endoscopy in 24 14 which showed multiple gastric polyps but was otherwise unremarkable  He had been complaining of dysphagia at that time and so empiric dilation was performed  Patient denies the symptom at present  REVIEW OF SYSTEMS IS OTHERWISE NEGATIVE        Historical Information   Past Medical History:   Diagnosis Date    Anxiety     Colon polyp     Coronary artery disease     Hypertension     Leg weakness     Lumbar radiculopathy      Past Surgical History:   Procedure Laterality Date    CORONARY STENT PLACEMENT  04/2020    GANGLION CYST EXCISION      HERNIA REPAIR Social History   Social History     Substance and Sexual Activity   Alcohol Use Yes    Comment: rare     Social History     Substance and Sexual Activity   Drug Use No     Social History     Tobacco Use   Smoking Status Never Smoker   Smokeless Tobacco Never Used     Family History   Problem Relation Age of Onset    Breast cancer Mother        Meds/Allergies       Current Outpatient Medications:     acetaminophen (TYLENOL) 500 mg tablet    amLODIPine (NORVASC) 5 mg tablet    aspirin (ECOTRIN LOW STRENGTH) 81 mg EC tablet    atorvastatin (LIPITOR) 40 mg tablet    clopidogrel (PLAVIX) 75 mg tablet    doxycycline (ADOXA) 100 MG tablet    esomeprazole (NexIUM) 20 mg capsule    gabapentin (NEURONTIN) 100 mg capsule    hydrOXYzine HCL (ATARAX) 25 mg tablet    hydrOXYzine pamoate (VISTARIL) 25 mg capsule    losartan-hydrochlorothiazide (HYZAAR) 100-12 5 MG per tablet    Melatonin 10 MG TABS    metoprolol tartrate (LOPRESSOR) 25 mg tablet    multivitamin (THERAGRAN) TABS    olmesartan (BENICAR) 40 mg tablet    Omega-3 Fatty Acids (FISH OIL OMEGA-3 PO)    ticagrelor (BRILINTA) 90 MG    traZODone (DESYREL) 50 mg tablet    Allergies   Allergen Reactions    Escitalopram Anxiety           Objective     Blood pressure 130/78, pulse 67, height 5' 5" (1 651 m), weight 67 1 kg (148 lb)  Body mass index is 24 63 kg/m²  PHYSICAL EXAM:      General Appearance:   Alert, cooperative, no distress   HEENT:   Normocephalic, atraumatic, anicteric      Neck:  Supple, symmetrical, trachea midline   Lungs:   Clear to auscultation bilaterally; no rales, rhonchi or wheezing; respirations unlabored    Heart[de-identified]   Regular rate and rhythm; no murmur, rub, or gallop     Abdomen:   Soft, non-tender, non-distended; normal bowel sounds; no masses, no organomegaly    Genitalia:   Deferred    Rectal:   Deferred    Extremities:  No cyanosis, clubbing or edema    Pulses:  2+ and symmetric    Skin:  No jaundice, rashes, or lesions    Lymph nodes:  No palpable cervical lymphadenopathy        Lab Results:   No visits with results within 1 Day(s) from this visit  Latest known visit with results is:   Admission on 03/14/2018, Discharged on 03/14/2018   Component Date Value    WBC 03/14/2018 11 55*    RBC 03/14/2018 4 35     Hemoglobin 03/14/2018 14 4     Hematocrit 03/14/2018 42 1     MCV 03/14/2018 97     MCH 03/14/2018 33 1     MCHC 03/14/2018 34 2     RDW 03/14/2018 12 0     MPV 03/14/2018 10 6     Platelets 38/99/4153 276     nRBC 03/14/2018 0     Neutrophils Relative 03/14/2018 61     Lymphocytes Relative 03/14/2018 28     Monocytes Relative 03/14/2018 7     Eosinophils Relative 03/14/2018 4     Basophils Relative 03/14/2018 0     Neutrophils Absolute 03/14/2018 7 03     Lymphocytes Absolute 03/14/2018 3 25     Monocytes Absolute 03/14/2018 0 78     Eosinophils Absolute 03/14/2018 0 41     Basophils Absolute 03/14/2018 0 05     Sodium 03/14/2018 140     Potassium 03/14/2018 3 7     Chloride 03/14/2018 105     CO2 03/14/2018 29     ANION GAP 03/14/2018 6     BUN 03/14/2018 21     Creatinine 03/14/2018 1 15     Glucose 03/14/2018 120     Calcium 03/14/2018 8 2*    AST 03/14/2018 19     ALT 03/14/2018 25     Alkaline Phosphatase 03/14/2018 62     Total Protein 03/14/2018 6 8     Albumin 03/14/2018 3 3*    Total Bilirubin 03/14/2018 0 40     eGFR 03/14/2018 62     Troponin I 03/14/2018 <0 02     Troponin I 03/14/2018 <0 02     Ventricular Rate 03/14/2018 72     Atrial Rate 03/14/2018 72     [place] Interval 03/14/2018 166     QRSD Interval 03/14/2018 106     QT Interval 03/14/2018 394     QTC Interval 03/14/2018 431     P Axis 03/14/2018 50     QRS Axis 03/14/2018 -40     T Wave Axis 03/14/2018 15          Radiology Results:   No results found

## 2021-10-25 ENCOUNTER — TELEPHONE (OUTPATIENT)
Dept: GASTROENTEROLOGY | Facility: HOSPITAL | Age: 76
End: 2021-10-25

## 2021-10-26 ENCOUNTER — ANESTHESIA (OUTPATIENT)
Dept: GASTROENTEROLOGY | Facility: HOSPITAL | Age: 76
End: 2021-10-26

## 2021-10-26 ENCOUNTER — HOSPITAL ENCOUNTER (OUTPATIENT)
Dept: GASTROENTEROLOGY | Facility: HOSPITAL | Age: 76
Setting detail: OUTPATIENT SURGERY
Discharge: HOME | End: 2021-10-26
Payer: COMMERCIAL

## 2021-10-26 ENCOUNTER — ANESTHESIA EVENT (OUTPATIENT)
Dept: GASTROENTEROLOGY | Facility: HOSPITAL | Age: 76
End: 2021-10-26

## 2021-10-26 VITALS
DIASTOLIC BLOOD PRESSURE: 88 MMHG | BODY MASS INDEX: 24.87 KG/M2 | WEIGHT: 149.25 LBS | SYSTOLIC BLOOD PRESSURE: 143 MMHG | RESPIRATION RATE: 16 BRPM | TEMPERATURE: 97.8 F | HEIGHT: 65 IN | OXYGEN SATURATION: 98 % | HEART RATE: 61 BPM

## 2021-10-26 DIAGNOSIS — R10.13 EPIGASTRIC PAIN: ICD-10-CM

## 2021-10-26 DIAGNOSIS — K21.9 GASTROESOPHAGEAL REFLUX DISEASE, UNSPECIFIED WHETHER ESOPHAGITIS PRESENT: ICD-10-CM

## 2021-10-26 PROCEDURE — 88305 TISSUE EXAM BY PATHOLOGIST: CPT | Performed by: PATHOLOGY

## 2021-10-26 PROCEDURE — 43239 EGD BIOPSY SINGLE/MULTIPLE: CPT | Performed by: INTERNAL MEDICINE

## 2021-10-26 RX ORDER — LIDOCAINE HYDROCHLORIDE 20 MG/ML
INJECTION, SOLUTION EPIDURAL; INFILTRATION; INTRACAUDAL; PERINEURAL AS NEEDED
Status: DISCONTINUED | OUTPATIENT
Start: 2021-10-26 | End: 2021-10-26

## 2021-10-26 RX ORDER — PROPOFOL 10 MG/ML
INJECTION, EMULSION INTRAVENOUS AS NEEDED
Status: DISCONTINUED | OUTPATIENT
Start: 2021-10-26 | End: 2021-10-26

## 2021-10-26 RX ORDER — SODIUM CHLORIDE, SODIUM LACTATE, POTASSIUM CHLORIDE, CALCIUM CHLORIDE 600; 310; 30; 20 MG/100ML; MG/100ML; MG/100ML; MG/100ML
INJECTION, SOLUTION INTRAVENOUS CONTINUOUS PRN
Status: DISCONTINUED | OUTPATIENT
Start: 2021-10-26 | End: 2021-10-26

## 2021-10-26 RX ADMIN — PROPOFOL 100 MG: 10 INJECTION, EMULSION INTRAVENOUS at 07:20

## 2021-10-26 RX ADMIN — PROPOFOL 20 MG: 10 INJECTION, EMULSION INTRAVENOUS at 07:23

## 2021-10-26 RX ADMIN — PROPOFOL 20 MG: 10 INJECTION, EMULSION INTRAVENOUS at 07:25

## 2021-10-26 RX ADMIN — LIDOCAINE HYDROCHLORIDE 80 MG: 20 INJECTION, SOLUTION EPIDURAL; INFILTRATION; INTRACAUDAL; PERINEURAL at 07:18

## 2021-10-26 RX ADMIN — SODIUM CHLORIDE, SODIUM LACTATE, POTASSIUM CHLORIDE, AND CALCIUM CHLORIDE: .6; .31; .03; .02 INJECTION, SOLUTION INTRAVENOUS at 07:10

## 2021-11-04 ENCOUNTER — TELEPHONE (OUTPATIENT)
Dept: GASTROENTEROLOGY | Facility: CLINIC | Age: 76
End: 2021-11-04

## 2022-06-09 RX ORDER — GABAPENTIN 100 MG/1
100 CAPSULE ORAL
COMMUNITY
Start: 2021-10-18

## 2022-06-09 RX ORDER — HYDROXYZINE HYDROCHLORIDE 25 MG/1
25 TABLET, FILM COATED ORAL EVERY 8 HOURS PRN
COMMUNITY
Start: 2021-10-18

## 2022-06-10 ENCOUNTER — OFFICE VISIT (OUTPATIENT)
Dept: GASTROENTEROLOGY | Facility: CLINIC | Age: 77
End: 2022-06-10
Payer: COMMERCIAL

## 2022-06-10 VITALS
SYSTOLIC BLOOD PRESSURE: 124 MMHG | OXYGEN SATURATION: 98 % | HEART RATE: 65 BPM | WEIGHT: 134.2 LBS | BODY MASS INDEX: 22.36 KG/M2 | DIASTOLIC BLOOD PRESSURE: 74 MMHG | HEIGHT: 65 IN

## 2022-06-10 DIAGNOSIS — R63.4 WEIGHT LOSS, ABNORMAL: ICD-10-CM

## 2022-06-10 DIAGNOSIS — K59.00 CONSTIPATION, UNSPECIFIED CONSTIPATION TYPE: Primary | ICD-10-CM

## 2022-06-10 PROCEDURE — 99214 OFFICE O/P EST MOD 30 MIN: CPT | Performed by: INTERNAL MEDICINE

## 2022-06-10 NOTE — PROGRESS NOTES
[facility] - Outpatient Follow-up Note  López Lassiter 64 y o  male MRN: [id number]  Encounter: [id number]          ASSESSMENT AND PLAN:      1  Constipation, unspecified constipation type    2  Weight loss, abnormal    Increase fiber intake to include fruits, vegetables, and whole grain foods, daily    Begin taking a supplemental fiber twice daily  I gave him for options which include Benefiber or FiberCon or Metamucil or gummy fibers  Continue with prune juice daily    Repeat colonoscopy is not medically necessary    ______________________________________________________________________    SUBJECTIVE:  This 49-year-old male presents to the office for the evaluation of constipation problems  He states this is constipation problem has been ongoing for the past several months  He also states that his wife has been diagnosed with breast cancer metastatic to the bone  This is a diagnosis that was established years ago but he admits that the stress associated with this diagnosis is significant  He states now that he and his son are eating frozen dinners that the order in from a company  He states that drinking prune juice is helping him have bowel movements more frequently  If he drinks the prune juice then he will have a bowel movement at least every other day  If he does not drink the prune juice then he will have a bowel movement every 3-4 days  He states that MiraLax has not been helping him  He states that he does drink a lot of water  He admits to eating less than he used to eat  He states that he has lost weight and the last note from September of 2021 documented his weight is 148 lb and his current weight is 134 lb  REVIEW OF SYSTEMS IS OTHERWISE NEGATIVE        Historical Information   Past Medical History:   Diagnosis Date    Anxiety     Colon polyp     Coronary artery disease     Hypertension     Leg weakness     Lumbar radiculopathy      Past Surgical History:   Procedure Laterality Date    COLONOSCOPY      CORONARY STENT PLACEMENT  04/2020    GANGLION CYST EXCISION      HERNIA REPAIR       Social History   Social History     Substance and Sexual Activity   Alcohol Use Not Currently    Comment: rare     Social History     Substance and Sexual Activity   Drug Use No     Social History     Tobacco Use   Smoking Status Never Smoker   Smokeless Tobacco Never Used     Family History   Problem Relation Age of Onset    Breast cancer Mother        Meds/Allergies       Current Outpatient Medications:     acetaminophen (TYLENOL) 500 mg tablet    amLODIPine (NORVASC) 5 mg tablet    aspirin (ECOTRIN LOW STRENGTH) 81 mg EC tablet    atorvastatin (LIPITOR) 40 mg tablet    doxycycline (ADOXA) 100 MG tablet    esomeprazole (NexIUM) 20 mg capsule    gabapentin (NEURONTIN) 100 mg capsule    gabapentin (NEURONTIN) 100 mg capsule    hydrOXYzine HCL (ATARAX) 25 mg tablet    hydrOXYzine HCL (ATARAX) 25 mg tablet    hydrOXYzine pamoate (VISTARIL) 25 mg capsule    losartan-hydrochlorothiazide (HYZAAR) 100-12 5 MG per tablet    Melatonin 10 MG TABS    metoprolol tartrate (LOPRESSOR) 25 mg tablet    multivitamin (THERAGRAN) TABS    olmesartan (BENICAR) 40 mg tablet    ticagrelor (BRILINTA) 90 MG    traZODone (DESYREL) 50 mg tablet    clopidogrel (PLAVIX) 75 mg tablet    Omega-3 Fatty Acids (FISH OIL OMEGA-3 PO)    Allergies   Allergen Reactions    Escitalopram Anxiety           Objective     Blood pressure 124/74, pulse 65, height 5' 5" (1 651 m), weight 60 9 kg (134 lb 3 2 oz), SpO2 98 %  Body mass index is 22 33 kg/m²  PHYSICAL EXAM:      General Appearance:   Alert, cooperative, no distress   HEENT:   Normocephalic, atraumatic, anicteric      Neck:  Supple, symmetrical, trachea midline   Lungs:   Clear to auscultation bilaterally; no rales, rhonchi or wheezing; respirations unlabored    Heart[de-identified]   Regular rate and rhythm; no murmur, rub, or gallop  Abdomen:   Soft, non-tender, non-distended; normal bowel sounds; no masses, no organomegaly    Genitalia:   Deferred    Rectal:   Deferred    Extremities:  No cyanosis, clubbing or edema    Pulses:  2+ and symmetric    Skin:  No jaundice, rashes, or lesions    Lymph nodes:  No palpable cervical lymphadenopathy        Lab Results:   No visits with results within 1 Day(s) from this visit  Latest known visit with results is:   Hospital Outpatient Visit on 10/26/2021   Component Date Value    Case Report 10/26/2021                      Value:Surgical Pathology Report                         Case: [id number]                                   Authorizing Provider:  Yao Meaz DO          Collected:           10/26/2021 4518              Ordering Location:      [facility]       Received:            10/26/2021 [address] [facility]                                                             Pathologist:           Romelia Quinn DO                                                     Specimens:   A) - Stomach                                                                                        B) - Polyp, Stomach/Small Intestine                                                        Final Diagnosis 10/26/2021                      Value: This result contains rich text formatting which cannot be displayed here   Note 10/26/2021                      Value: This result contains rich text formatting which cannot be displayed here   Additional Information 10/26/2021                      Value: This result contains rich text formatting which cannot be displayed here  Kristel Mullet Gross Description 10/26/2021                      Value: This result contains rich text formatting which cannot be displayed here   Clinical Information 10/26/2021                      Value:Cold bx eval h pylori         Radiology Results:   No results found